# Patient Record
Sex: FEMALE | Race: AMERICAN INDIAN OR ALASKA NATIVE | HISPANIC OR LATINO
[De-identification: names, ages, dates, MRNs, and addresses within clinical notes are randomized per-mention and may not be internally consistent; named-entity substitution may affect disease eponyms.]

---

## 2021-12-08 PROBLEM — Z00.00 ENCOUNTER FOR PREVENTIVE HEALTH EXAMINATION: Status: ACTIVE | Noted: 2021-12-08

## 2021-12-14 ENCOUNTER — RESULT REVIEW (OUTPATIENT)
Age: 86
End: 2021-12-14

## 2021-12-14 ENCOUNTER — APPOINTMENT (OUTPATIENT)
Dept: SURGICAL ONCOLOGY | Facility: CLINIC | Age: 86
End: 2021-12-14
Payer: MEDICARE

## 2021-12-14 ENCOUNTER — APPOINTMENT (OUTPATIENT)
Dept: CT IMAGING | Facility: CLINIC | Age: 86
End: 2021-12-14
Payer: MEDICARE

## 2021-12-14 ENCOUNTER — OUTPATIENT (OUTPATIENT)
Dept: OUTPATIENT SERVICES | Facility: HOSPITAL | Age: 86
LOS: 1 days | End: 2021-12-14

## 2021-12-14 VITALS
BODY MASS INDEX: 21.14 KG/M2 | SYSTOLIC BLOOD PRESSURE: 135 MMHG | TEMPERATURE: 98.8 F | HEART RATE: 89 BPM | WEIGHT: 112 LBS | HEIGHT: 61 IN | OXYGEN SATURATION: 98 % | DIASTOLIC BLOOD PRESSURE: 74 MMHG

## 2021-12-14 DIAGNOSIS — Z86.2 PERSONAL HISTORY OF DISEASES OF THE BLOOD AND BLOOD-FORMING ORGANS AND CERTAIN DISORDERS INVOLVING THE IMMUNE MECHANISM: ICD-10-CM

## 2021-12-14 DIAGNOSIS — Z86.69 PERSONAL HISTORY OF OTHER DISEASES OF THE NERVOUS SYSTEM AND SENSE ORGANS: ICD-10-CM

## 2021-12-14 DIAGNOSIS — Z86.19 PERSONAL HISTORY OF OTHER INFECTIOUS AND PARASITIC DISEASES: ICD-10-CM

## 2021-12-14 DIAGNOSIS — Z87.891 PERSONAL HISTORY OF NICOTINE DEPENDENCE: ICD-10-CM

## 2021-12-14 DIAGNOSIS — Z80.0 FAMILY HISTORY OF MALIGNANT NEOPLASM OF DIGESTIVE ORGANS: ICD-10-CM

## 2021-12-14 DIAGNOSIS — Z86.73 PERSONAL HISTORY OF TRANSIENT ISCHEMIC ATTACK (TIA), AND CEREBRAL INFARCTION W/OUT RESIDUAL DEFICITS: ICD-10-CM

## 2021-12-14 DIAGNOSIS — M32.9 SYSTEMIC LUPUS ERYTHEMATOSUS, UNSPECIFIED: ICD-10-CM

## 2021-12-14 DIAGNOSIS — Z87.11 PERSONAL HISTORY OF PEPTIC ULCER DISEASE: ICD-10-CM

## 2021-12-14 PROCEDURE — 74177 CT ABD & PELVIS W/CONTRAST: CPT | Mod: 26

## 2021-12-14 PROCEDURE — 99204 OFFICE O/P NEW MOD 45 MIN: CPT

## 2021-12-14 PROCEDURE — 71260 CT THORAX DX C+: CPT | Mod: 26

## 2021-12-14 RX ORDER — ROSUVASTATIN CALCIUM 20 MG/1
20 TABLET, FILM COATED ORAL
Refills: 0 | Status: ACTIVE | COMMUNITY

## 2021-12-14 RX ORDER — LIDOCAINE 5% 700 MG/1
5 PATCH TOPICAL
Refills: 0 | Status: ACTIVE | COMMUNITY

## 2021-12-14 RX ORDER — HYDROXYCHLOROQUINE SULFATE 200 MG/1
200 TABLET, FILM COATED ORAL
Refills: 0 | Status: ACTIVE | COMMUNITY

## 2021-12-14 RX ORDER — PANTOPRAZOLE 40 MG/1
40 TABLET, DELAYED RELEASE ORAL
Refills: 0 | Status: ACTIVE | COMMUNITY

## 2021-12-14 RX ORDER — ASPIRIN 81 MG
81 TABLET, DELAYED RELEASE (ENTERIC COATED) ORAL
Refills: 0 | Status: ACTIVE | COMMUNITY

## 2021-12-14 RX ORDER — ALENDRONATE SODIUM 70 MG/1
70 TABLET ORAL
Refills: 0 | Status: ACTIVE | COMMUNITY

## 2021-12-14 NOTE — REASON FOR VISIT
[Initial Consultation] : an initial consultation for [Gastric Cancer] : gastric cancer [Family Member] : family member

## 2021-12-15 ENCOUNTER — TRANSCRIPTION ENCOUNTER (OUTPATIENT)
Age: 86
End: 2021-12-15

## 2021-12-15 NOTE — RESULTS/DATA
[FreeTextEntry1] : Diagnostic Studies\par \par Date: 11/23/21\par Study: EGD (Dr. 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 Note: The tumor cells are positive for CK7, CDX2 (focal and weak), and P53 (focal and weak, wild type), while negative for CK20, ER, and GATA3. The morphology and immuno profile are consistent with a gastrointestinal origin.

## 2021-12-15 NOTE — HISTORY OF PRESENT ILLNESS
[de-identified] : Patient Name: ROXI DÍAZ \par MRN: [id number] \par Brent MRN:\par Referring Provider: EFREN SOUZA MD \par Oncologist: alejandrina\par Date: 12/14/21\par \par Diagnosis: gastric cancer\par \par 86 year female  presents for evaluation of gastric cancer\par Patient's medical history information below is from Dr. Souza's note and daughters report.\par She developed abdominal pain (RUQ), bloating, and heartburn back in 2014/2015. She has been following up and worked up for this since then. She's reportedly had an EGD and colonoscopy in 2014 that didn't show any suggestive findings. She was started on Ranitadine and had improvement in symptoms for a few years. In 2019, she followed up and had complaints of epigastric and substernal and LLQ abdominal pain. She was also anemic and was at [facility] where she was advised to have an upper EGD and colonoscopy. In March 2020 she got COVID and was noticing that she is losing weight unintentionally. Patient did not go for colon/egd until 8/2020 when she was found to have gastric erosions and was positive for H pylori. 2 months later she tested positive for H Pylori again. She reportedly had a CT scan in May 2021 that showed trace pelvic ascites. was supposed to have a colonoscopy again but was unable to because she had 3 pleural effusions and was seeing a doctor at [facility] for this. In August 6, 2021 she was admitted to [facility] for workup of stroke. She had a TIA. She was discharged home but had drooping in her face while at home and went back to [facility] on August 10, 2021 and was diagnosed with Guillain [place] Syndrome. She was being treated inpatient and was also diagnosed with Lupus and Lyme disease. CT scan during hospitalization revealed an antral lesion. EGD advised. EGD was done in December 2021.  EGD showed soft tissue lesion in prepyloric/pyloric channel area. biopsy is positive for adenocarcinoma.\par \par Currently, Ms. DÍAZ denies abdominal pain or discomfort. Denies nausea or vomiting. Has had weight loss.\par \par Functional Status: Ms. DÍAZ is able to walk steps slowly without fatigue or dyspnea but is unsteady at times on long walks and uses a wheelchair at times to get around.\par

## 2021-12-15 NOTE — PHYSICAL EXAM
[Normal Neck Lymph Nodes] : normal neck lymph nodes  [Normal Supraclavicular Lymph Nodes] : normal supraclavicular lymph nodes [Normal] : oriented to person, place and time, with appropriate affect [de-identified] : anicteric [de-identified] : S1,S2, regular rate and rhythm. No murmurs heard. [de-identified] : Clear throughout. No wheezes heard. [de-identified] : warm, dry

## 2021-12-15 NOTE — ASSESSMENT
[FreeTextEntry1] : I) gastric cancer\par \par P) Long discussion w patient and daughter w . Has what looks like an early lesion in the antrum. Will need updated imaging for staging. We discussed treatment will involve surgey (minimally invasive distal gastrectomy) and we will discuss whether a role for preop chemotherapy based on imaging. Given patients overall appearance and age, I am leaning toward resection if no evidence for advanced disease as she wont tolerate FLOT which is the best preop regimen , and there is benefit to adjuvant Capox (Classic trial). She also needs medical evaluation and clearance. We will review at tumor board as well. Final planning and recs will be made after obtaining all the added staging information and medical evaluation. All questions answered.\par \par Gab Soto MD\par \par Chief Surgical Oncology\par Multidisciplinary GI cancer program\par [facility]\par [facility]\par \par Professor Surgery\par [employer] [facility] [employer]\par \par cc Dr Tres Howard [address], [place] [address]

## 2021-12-15 NOTE — REVIEW OF SYSTEMS
[Negative] : Heme/Lymph [FreeTextEntry8] : as per HPI [de-identified] : neuralgia from history of shingles [de-identified] : feels anxious/depression lately

## 2021-12-21 ENCOUNTER — NON-APPOINTMENT (OUTPATIENT)
Age: 86
End: 2021-12-21

## 2021-12-22 ENCOUNTER — RESULT REVIEW (OUTPATIENT)
Age: 86
End: 2021-12-22

## 2021-12-22 ENCOUNTER — OUTPATIENT (OUTPATIENT)
Dept: OUTPATIENT SERVICES | Facility: HOSPITAL | Age: 86
LOS: 1 days | Discharge: HOME | End: 2021-12-22
Payer: MEDICARE

## 2021-12-22 ENCOUNTER — APPOINTMENT (OUTPATIENT)
Dept: GASTROENTEROLOGY | Facility: HOSPITAL | Age: 86
End: 2021-12-22

## 2021-12-22 PROCEDURE — 88305 TISSUE EXAM BY PATHOLOGIST: CPT | Mod: 26

## 2021-12-22 PROCEDURE — 43259 EGD US EXAM DUODENUM/JEJUNUM: CPT | Mod: 59

## 2021-12-22 PROCEDURE — 43239 EGD BIOPSY SINGLE/MULTIPLE: CPT

## 2021-12-22 PROCEDURE — 43237 ENDOSCOPIC US EXAM ESOPH: CPT

## 2021-12-22 PROCEDURE — 88305 TISSUE EXAM BY PATHOLOGIST: CPT

## 2021-12-27 LAB — SURGICAL PATHOLOGY STUDY: SIGNIFICANT CHANGE UP

## 2022-01-04 ENCOUNTER — RESULT REVIEW (OUTPATIENT)
Age: 87
End: 2022-01-04

## 2022-01-04 ENCOUNTER — OUTPATIENT (OUTPATIENT)
Dept: OUTPATIENT SERVICES | Facility: HOSPITAL | Age: 87
LOS: 1 days | End: 2022-01-04
Payer: MEDICARE

## 2022-01-04 ENCOUNTER — APPOINTMENT (OUTPATIENT)
Dept: SURGICAL ONCOLOGY | Facility: CLINIC | Age: 87
End: 2022-01-04
Payer: MEDICARE

## 2022-01-04 DIAGNOSIS — C16.9 MALIGNANT NEOPLASM OF STOMACH, UNSPECIFIED: ICD-10-CM

## 2022-01-04 DIAGNOSIS — J90 PLEURAL EFFUSION, NOT ELSEWHERE CLASSIFIED: ICD-10-CM

## 2022-01-04 LAB — SURGICAL PATHOLOGY STUDY: SIGNIFICANT CHANGE UP

## 2022-01-04 PROCEDURE — 88321 CONSLTJ&REPRT SLD PREP ELSWR: CPT

## 2022-01-04 PROCEDURE — 99213 OFFICE O/P EST LOW 20 MIN: CPT | Mod: 95

## 2022-01-04 NOTE — HISTORY OF PRESENT ILLNESS
[Home] : at home, [unfilled] , at the time of the visit. [Medical Office: ([facility])___] : at the medical office located in  [Formal Caregiver] : formal caregiver [Verbal consent obtained from patient] : the patient, [unfilled] [Family Member] : family member [de-identified] : Patient Name: ROXI DÍAZ \par MRN: [id number] \par Brent MRN: [id number]\par Referring Provider: EFREN SOUZA MD \par Oncologist: alejandrina\par Date: 1/4/21\par \par Diagnosis: gastric cancer\par \par 86 year female  presents for follow up of gastric cancer. Since her initial consultation, she had a CT CAP and underwent a thoracentesis on 12/21/21, she is awaiting to see her cardiologist, and she also underwent an EUS with Dr. Teran on 12/22/21 and is here to discuss surgical planning. \par \par She denies abdominal pain, she is tolerating two meals a day and two ensure per day, she denies any nausea or vomiting. She denies any changes to bowel movements.

## 2022-01-04 NOTE — ASSESSMENT
[FreeTextEntry1] : I) Gastric cancer\par \par P) We reviewed all the data which shows an early gastric cancer. Standard of care is a resection which will require a distal gastrectomy. Will do minimally invasively. Risks and benefits discussed, including but not limited to post bleeding, infection, anastomotic leak. Still needs cardiac clearance and a repeat CXR to assure the effusion has not reaccumulated. All questions answered. Tentatively aiming for end of January pending the clearances.\par \par Gab Soto MD\par \par Chief Surgical Oncology\par Multidisciplinary GI cancer program\par [facility]\par [facility]\par \par Professor Surgery\par [employer] [facility] [employer]\par  cc Dr Tres Howard\par \par \par

## 2022-01-04 NOTE — RESULTS/DATA
[FreeTextEntry1] : Diagnostic Studies\par \par Date: 12/22/21\par Study: EGD/EUS\par Results:Impressions:  \par  Early gastric cancer. Evidence of an EGC without any deep invasion. Possibly \par T1A lesion. No LNs identified.  \par \par \par Pathology:\par Patient:   ROXI DÍAZ\par \par \par Accession:                             [id number]\par \par Collected Date/Time:                   12/22/2021 15:06 EST\par Received Date/Time:                    12/22/2021 18:17 EST\par \par Addendum Report - Auth (Verified)\par \par Addendum\par HER2 IMMUNOHISTOCHEMICAL ANALYSIS FOR GASTROINTESTINAL CARCINOMA\par \par Addendum issued to report results of HER2 Immunohistochemical Analysis.\par This test was performed by an outside laboratory. For all patient care and\par clinical decision making purposes refer solely to original laboratory\par report. The information transcribed here is not the entire report. This\par shorten version has been placed here for the purpose of the electronic\par record.\par \par Performing Laboratory: [facility]\par Surgical ID #: [id number]\par Specimen ID: [id number]\par Date reported by Outside Laboratory: 12/29/2021\par Submitting Physician: Dr. Ignacio Teran\par Outside report electronically signed by: Dr. Shana Alvarez\par \par \par RESULTS\par Antibody   Clone  Description   Results\par Her2/nestor by IHC   4B5   c-erb-2 oncoprotein   0 / Negative\par \par COMMENT\par Immunohistochemistry scoring for Her2 in gastric and gastroesophageal\par carcinoma (2016 CAP/ASCO guidelines)\par \par Biopsy specimen staining pattern:\par 0 No reactivity or no membranous reactivity in any tumor cell\par 1+ Tumor cell cluster with a faint or barely perceptible membranous\par reactivity irrespective of percentage of tumor cells\par 2+ Tumor cell cluster with a weak to moderate complete, basolateral, or\par lateral membranous reactivity irrespective of percentage of tumor cells\par stained\par 3+ Tumor cell cluster with a strong complete, basolateral, or lateral\par membranous reactivity irrespective of tumor cells stained\par \par Surgical specimen staining pattern:\par \par 0 No reactivity or membranous reactivity in less than 10% of tumor cells\par 1+ Faint or barely perceptible membranous reactivity in 10% or more of\par tumor cells; cells are reactive only in part of their membrane.\par 2+ Weak to moderate complete, basolateral, or lateral membranous\par reactivity in 10% or more of tumor cells\par 3+ Strong complete, basolateral, or lateral membranous reactivity in 10%\par or more of tumor cells\par \par This test is for investigation use only in colorectal carcinoma.\par A positive control for each antibody has been reviewed and accepted.\par \par Shana Alvarez M.D.\par Pathologist, ex. [phone]\par This report was electronically signed.\par \par This addendum reports the results of HER2 Immunohistochemical Analysis,\par reported on 12/29/2021 by [facility], a specialized [facility].\par Technical and professional components were performed at [facility]\par [employer] [address] [place] [address],\par phone:  [phone], on [facility] specimen [id number]\par with [facility] ID [id number].  Please refer to the official [facility] report\par with complete information including assay design and methodology, on file\par in the Pathology Department.\par \par Verified by: Nael Kumar M.D.\par (Electronic Signature)\par Reported on: 12/30/21 18:36 EST, [facility], [address],\[place] [address]\par Phone: [phone]   Fax: [phone]\par _________________________________________________________________\par \par Surgical Pathology Report - Auth (Verified)\par Specimen(s) Submitted\par 1 antrum mass\par \par \par Final Diagnosis\par Stomach, antrum, biopsy:\par - 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 A second intradepartmental\par pathologist has reviewed this case and agrees in accordance with\par departmental policy.\par \par Verified by: Nael Kumar M.D.\par (Electronic Signature)\par Reported on: 12/27/21 16:16 EST, [facility], [address] [place] [address],\[place] [address]\par Phone: [phone]   Fax: [phone]\par _________________________________________________________________\par \par Clinical Information\par Gastric cancer\par \par Gross Description\par Received in formalin labeled   "antrum mass"  is a 0.3 cm in greatest\par dimension tan-pink, soft tissue fragment. Entirely in one cassette: 1A.\par \par MARGE Overton ([facility]) 12/23/2021 09:36 AM\par \par \par Disclaimer\par In addition to other data that may appear on the specimen containers, all\par labels have been inspected to confirm the presence of the patient's name\par and date of birth.\par \par Histological Processing Performed at [facility], Department of\par Pathology, [address] [place] [address].\par

## 2022-01-06 ENCOUNTER — APPOINTMENT (OUTPATIENT)
Dept: CT IMAGING | Facility: CLINIC | Age: 87
End: 2022-01-06
Payer: MEDICARE

## 2022-01-06 ENCOUNTER — OUTPATIENT (OUTPATIENT)
Dept: OUTPATIENT SERVICES | Facility: HOSPITAL | Age: 87
LOS: 1 days | End: 2022-01-06

## 2022-01-06 PROCEDURE — 75574 CT ANGIO HRT W/3D IMAGE: CPT | Mod: 26

## 2022-01-24 ENCOUNTER — LABORATORY RESULT (OUTPATIENT)
Age: 87
End: 2022-01-24

## 2022-01-26 ENCOUNTER — TRANSCRIPTION ENCOUNTER (OUTPATIENT)
Age: 87
End: 2022-01-26

## 2022-01-26 ENCOUNTER — RESULT REVIEW (OUTPATIENT)
Age: 87
End: 2022-01-26

## 2022-01-26 ENCOUNTER — INPATIENT (INPATIENT)
Facility: HOSPITAL | Age: 87
LOS: 6 days | Discharge: NURSING, REHAB OR LONG-TERM CARE | DRG: 326 | End: 2022-02-02
Attending: SURGERY | Admitting: SURGERY
Payer: MEDICARE

## 2022-01-26 VITALS
HEART RATE: 89 BPM | RESPIRATION RATE: 18 BRPM | SYSTOLIC BLOOD PRESSURE: 101 MMHG | DIASTOLIC BLOOD PRESSURE: 65 MMHG | OXYGEN SATURATION: 95 % | TEMPERATURE: 98 F

## 2022-01-26 LAB
ALBUMIN FLD-MCNC: 2 G/DL — SIGNIFICANT CHANGE UP
APTT BLD: 31.8 SEC — SIGNIFICANT CHANGE UP (ref 27.5–35.5)
BLD GP AB SCN SERPL QL: NEGATIVE — SIGNIFICANT CHANGE UP
CREAT FLD-MCNC: 1.11 MG/DL — SIGNIFICANT CHANGE UP
GLUCOSE FLD-MCNC: 130 MG/DL — SIGNIFICANT CHANGE UP
GRAM STN FLD: SIGNIFICANT CHANGE UP
HCT VFR BLD CALC: 28.8 % — LOW (ref 34.5–45)
HGB BLD-MCNC: 9.5 G/DL — LOW (ref 11.5–15.5)
INR BLD: 1.1 — SIGNIFICANT CHANGE UP (ref 0.88–1.16)
LDH SERPL L TO P-CCNC: 100 U/L — SIGNIFICANT CHANGE UP
MCHC RBC-ENTMCNC: 25.7 PG — LOW (ref 27–34)
MCHC RBC-ENTMCNC: 33 GM/DL — SIGNIFICANT CHANGE UP (ref 32–36)
MCV RBC AUTO: 77.8 FL — LOW (ref 80–100)
NRBC # BLD: 0 /100 WBCS — SIGNIFICANT CHANGE UP (ref 0–0)
PH, PLEURAL FLUID: 7.71 — SIGNIFICANT CHANGE UP
PLATELET # BLD AUTO: 175 K/UL — SIGNIFICANT CHANGE UP (ref 150–400)
PROT FLD-MCNC: 4.9 G/DL — SIGNIFICANT CHANGE UP
PROTHROM AB SERPL-ACNC: 13.1 SEC — SIGNIFICANT CHANGE UP (ref 10.6–13.6)
RBC # BLD: 3.7 M/UL — LOW (ref 3.8–5.2)
RBC # FLD: 14.7 % — HIGH (ref 10.3–14.5)
RH IG SCN BLD-IMP: POSITIVE — SIGNIFICANT CHANGE UP
SARS-COV-2 RNA SPEC QL NAA+PROBE: SIGNIFICANT CHANGE UP
SPECIMEN SOURCE FLD: SIGNIFICANT CHANGE UP
SPECIMEN SOURCE: SIGNIFICANT CHANGE UP
WBC # BLD: 6.81 K/UL — SIGNIFICANT CHANGE UP (ref 3.8–10.5)
WBC # FLD AUTO: 6.81 K/UL — SIGNIFICANT CHANGE UP (ref 3.8–10.5)

## 2022-01-26 PROCEDURE — 71045 X-RAY EXAM CHEST 1 VIEW: CPT | Mod: 26

## 2022-01-26 PROCEDURE — 88305 TISSUE EXAM BY PATHOLOGIST: CPT | Mod: 26

## 2022-01-26 PROCEDURE — 99222 1ST HOSP IP/OBS MODERATE 55: CPT | Mod: GC,25

## 2022-01-26 PROCEDURE — 32551 INSERTION OF CHEST TUBE: CPT | Mod: GC

## 2022-01-26 PROCEDURE — 88112 CYTOPATH CELL ENHANCE TECH: CPT | Mod: 26

## 2022-01-26 RX ORDER — HYDROXYCHLOROQUINE SULFATE 200 MG
1 TABLET ORAL
Qty: 0 | Refills: 0 | DISCHARGE

## 2022-01-26 RX ORDER — ONDANSETRON 8 MG/1
4 TABLET, FILM COATED ORAL EVERY 4 HOURS
Refills: 0 | Status: DISCONTINUED | OUTPATIENT
Start: 2022-01-26 | End: 2022-01-27

## 2022-01-26 RX ORDER — ACETAMINOPHEN 500 MG
650 TABLET ORAL EVERY 6 HOURS
Refills: 0 | Status: DISCONTINUED | OUTPATIENT
Start: 2022-01-26 | End: 2022-01-27

## 2022-01-26 RX ORDER — PANTOPRAZOLE SODIUM 20 MG/1
1 TABLET, DELAYED RELEASE ORAL
Qty: 0 | Refills: 0 | DISCHARGE

## 2022-01-26 RX ORDER — DULOXETINE HYDROCHLORIDE 30 MG/1
0 CAPSULE, DELAYED RELEASE ORAL
Qty: 0 | Refills: 0 | DISCHARGE

## 2022-01-26 RX ORDER — SODIUM CHLORIDE 9 MG/ML
1000 INJECTION, SOLUTION INTRAVENOUS
Refills: 0 | Status: DISCONTINUED | OUTPATIENT
Start: 2022-01-26 | End: 2022-01-27

## 2022-01-26 RX ORDER — ALENDRONATE SODIUM 70 MG/1
70 TABLET ORAL
Qty: 0 | Refills: 0 | DISCHARGE

## 2022-01-26 RX ORDER — GABAPENTIN 400 MG/1
300 CAPSULE ORAL
Refills: 0 | Status: DISCONTINUED | OUTPATIENT
Start: 2022-01-26 | End: 2022-01-27

## 2022-01-26 RX ORDER — ENOXAPARIN SODIUM 100 MG/ML
40 INJECTION SUBCUTANEOUS DAILY
Refills: 0 | Status: DISCONTINUED | OUTPATIENT
Start: 2022-01-26 | End: 2022-01-26

## 2022-01-26 RX ORDER — LATANOPROST 0.05 MG/ML
1 SOLUTION/ DROPS OPHTHALMIC; TOPICAL
Qty: 0 | Refills: 0 | DISCHARGE

## 2022-01-26 RX ORDER — ALENDRONATE SODIUM 70 MG/1
20 TABLET ORAL
Qty: 0 | Refills: 0 | DISCHARGE

## 2022-01-26 RX ORDER — GABAPENTIN 400 MG/1
1 CAPSULE ORAL
Qty: 0 | Refills: 0 | DISCHARGE

## 2022-01-26 RX ORDER — LIDOCAINE HCL 20 MG/ML
4 VIAL (ML) INJECTION ONCE
Refills: 0 | Status: COMPLETED | OUTPATIENT
Start: 2022-01-26 | End: 2022-01-26

## 2022-01-26 RX ORDER — DULOXETINE HYDROCHLORIDE 30 MG/1
20 CAPSULE, DELAYED RELEASE ORAL DAILY
Refills: 0 | Status: DISCONTINUED | OUTPATIENT
Start: 2022-01-26 | End: 2022-01-27

## 2022-01-26 RX ORDER — ENOXAPARIN SODIUM 100 MG/ML
40 INJECTION SUBCUTANEOUS EVERY 24 HOURS
Refills: 0 | Status: DISCONTINUED | OUTPATIENT
Start: 2022-01-26 | End: 2022-01-27

## 2022-01-26 RX ORDER — ROSUVASTATIN CALCIUM 5 MG/1
1 TABLET ORAL
Qty: 0 | Refills: 0 | DISCHARGE

## 2022-01-26 RX ORDER — ASPIRIN/CALCIUM CARB/MAGNESIUM 324 MG
1 TABLET ORAL
Qty: 0 | Refills: 0 | DISCHARGE

## 2022-01-26 RX ORDER — LIDOCAINE 4 G/100G
0 CREAM TOPICAL
Qty: 0 | Refills: 0 | DISCHARGE

## 2022-01-26 RX ADMIN — ENOXAPARIN SODIUM 40 MILLIGRAM(S): 100 INJECTION SUBCUTANEOUS at 22:04

## 2022-01-26 RX ADMIN — GABAPENTIN 300 MILLIGRAM(S): 400 CAPSULE ORAL at 22:04

## 2022-01-26 RX ADMIN — DULOXETINE HYDROCHLORIDE 20 MILLIGRAM(S): 30 CAPSULE, DELAYED RELEASE ORAL at 22:04

## 2022-01-26 RX ADMIN — SODIUM CHLORIDE 40 MILLILITER(S): 9 INJECTION, SOLUTION INTRAVENOUS at 23:57

## 2022-01-26 NOTE — H&P ADULT - ASSESSMENT
87 yo female with hx of lyme diseases, lupus and gastric adenocarcinoma presenting with a pleaural effusion prior to surgery. Admitted to [facility] for observation and pleural effusion drainage prior to surgery tomorrow (1/27).     Soft and bite sized with ensure, NPO @midnight  Nausea/Vomiting PRN  Lovenox/OOBA/IS  Pulm  OR tomorrow     85 yo female with hx of lyme diseases, lupus and gastric adenocarcinoma presenting with a pleural effusion prior to surgery. Admitted to [facility] for observation and pleural effusion drainage prior to surgery tomorrow (1/27).     Soft and bite sized with ensure, NPO @midnight  Nausea/Vomiting PRN  Lovenox/OOBA/IS  Pulm recs  OR tomorrow  Effusion drainage tonight

## 2022-01-26 NOTE — PATIENT PROFILE ADULT - FUNCTIONAL ASSESSMENT - DAILY ACTIVITY 6.
Did well overnight except chronic leg pain which is being worked up as outpatient.  AFVSS  Abd soft, min dist, NT    A/P: Resolving pSBO  1. clear liquids. If tolerated, low residue diet for lunch and d/c home in evening.    Dr. Lucas covering until 8/19 4 = No assist / stand by assistance

## 2022-01-26 NOTE — PATIENT PROFILE ADULT - FALL HARM RISK - HARM RISK INTERVENTIONS

## 2022-01-26 NOTE — H&P ADULT - NSICDXPASTMEDICALHX_GEN_ALL_CORE_FT
PAST MEDICAL HISTORY:  Anemia due to acute blood loss     Gastric cancer     Gastric ulcer     GBS (Guillain [place] syndrome)     H/O Helicobacter infection     H/o Lyme disease     H/O pulmonary valve disease     History of TIA due to embolism     LE (lupus erythematosus)     Malignancy     Pleural effusion, left     Stroke

## 2022-01-26 NOTE — H&P ADULT - HISTORY OF PRESENT ILLNESS
85 yo female with hx of TIA, lyme diseases, lupus, Guillain [place] Syndrome and gastric adenocarcinoma presenting with a pleaural effusion prior to surgery. The effusion has been drained several times before - last time in November Becomes short of breath when walking 20 steps, sleeps on 2-3 pillows. She denies dysphagia, nausea/vomiting, last BM yesterday.   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, moderately differentiated, intestinal type, immuno possitive for CK-7, p53, neg CK20, ER and JULIANNA -3 , Her2/nestor   Colonoscopy - 5 years ago (normal)    PMH: lyme disease, lupus, COVID feb 2021  PSH: none  Fam Hx: suspected gallbladder cancer - mother  Soc: smoker in 20s for 5 years - pack a day, no alcohol, no drugs  Meds: ASA 81mg, gabapentin 300 mg BID, hydroxychloroquine 200mg, rosuvastatin 20mg, duloxetine 20mg   85 yo female with hx of TIA, lyme diseases, lupus, Guillain [place] Syndrome and gastric adenocarcinoma presenting with a pleaural effusion prior to surgery. The effusion has been drained several times before - last time in November Becomes short of breath when walking 20 steps, sleeps on 2-3 pillows. She denies dysphagia, nausea/vomiting, last BM yesterday.   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

## 2022-01-26 NOTE — H&P ADULT - NSICDXFAMILYHX_GEN_ALL_CORE_FT
FAMILY HISTORY:  Mother  Still living? Unknown  Family history of cancer of gallbladder, Age at diagnosis: Age Unknown

## 2022-01-26 NOTE — H&P ADULT - NSHPLABSRESULTS_GEN_ALL_CORE
.  LABS:                         9.5    6.81  )-----------( 175      ( 26 Jan 2022 16:49 )             28.8           PT/INR - ( 26 Jan 2022 16:49 )   PT: 13.1 sec;   INR: 1.10          PTT - ( 26 Jan 2022 16:49 )  PTT:31.8 sec          RADIOLOGY, EKG & ADDITIONAL TESTS: Reviewed.

## 2022-01-26 NOTE — H&P ADULT - NSHPPHYSICALEXAM_GEN_ALL_CORE
VITALS:   T(C): 36.3 (01-26-22 @ 16:22), Max: 36.7 (01-26-22 @ 14:27)  HR: 90 (01-26-22 @ 16:22) (89 - 90)  BP: 126/71 (01-26-22 @ 16:22) (101/65 - 126/71)  RR: 17 (01-26-22 @ 16:22) (17 - 18)  SpO2: 96% (01-26-22 @ 16:22) (95% - 96%)    GENERAL: NAD, lying in bed comfortably  CHEST/LUNG: Unlabored respirations  HEART: Regular rate and rhythm  ABDOMEN: Soft, nontender, nondistended, no guarding   EXTREMITIES:  2+ Peripheral Pulses, brisk capillary refill, no edema  NERVOUS SYSTEM:  no focal deficits   SKIN: No rashes or lesions

## 2022-01-26 NOTE — PROCEDURE NOTE - ADDITIONAL PROCEDURE DETAILS
Ultrasound guided LEFT sided chest tube placement. Wire confirmed on ultrasound prior to dilation and catheter insertion.

## 2022-01-26 NOTE — PROCEDURE NOTE - NSPROCDETAILS_GEN_ALL_CORE
Seldinger technique/dressing applied/secured in place/sterile dressing applied/supine position/thoracostomy tube placed percutaneously/ultrasound assessment of fluid (location) Seldinger technique/dressing applied/secured in place/sterile dressing applied/thoracostomy tube placed percutaneously/ultrasound assessment of fluid (location)

## 2022-01-26 NOTE — CONSULT NOTE ADULT - ASSESSMENT
This is an 86F with PMHx of gastric adenocarcinoma, recent history of TIA, GBS, lupus and lyme disease and recurrent transudative left sided pleural effusions who presents for gastrectomy planned for 1/27. Interventional pulmonology consulted for placement of chest tube for L pleural effusion prior to surgery in anticipation of fluid recurrence.    #Left sided pleural effusion- simple    Patient with history of recurrent L pleural effusion and gastric adenocarcinoma. Fluid has reaccumulated and is symptomatic for the last half year. Ddx includes inc hydrostatic pressure vs 2/2 malignancy vs parapneumonic which is less likely. Will plan for chest tube placement perioperatively and will send fluid studies including cytology and triglyceride levels.    Recommend:  - Please send labs as soon as possible  - Will plan for chest tube placement tonight on left side    Patient discussed with attending Dr. Agee. This is an 86F with PMHx of gastric adenocarcinoma, recent history of TIA, GBS, lupus and lyme disease and recurrent transudative left sided pleural effusions who presents for gastrectomy planned for 1/27. Interventional pulmonology consulted for placement of chest tube for L pleural effusion prior to surgery in anticipation of fluid recurrence.    #Left sided pleural effusion- simple, recurrent, previously transudative    Patient with history of recurrent L pleural effusion and gastric adenocarcinoma. Fluid has reaccumulated and is symptomatic for the last half year. Ddx includes inc hydrostatic pressure vs 2/2 malignancy vs less likely parapneumonic. Will plan for chest tube placement perioperatively and will send fluid studies including cytology and triglyceride levels.    Recommend:  - S/p LEFT pigtail placement 1/26/2022  - Keep chest tube to waterseal  - Will f/u micro, cyto, cell count and studies  - Plan to remove chest tube post-op, will follow    Patient discussed with attending Dr. Agee. This is an 86F with PMHx of gastric adenocarcinoma, recent history of TIA, GBS, lupus and lyme disease and recurrent transudative left sided pleural effusions who presents for gastrectomy planned for 1/27. Interventional pulmonology consulted for placement of chest tube for L pleural effusion prior to surgery in anticipation of fluid recurrence.    #Left sided pleural effusion- simple, recurrent, previously transudative    Patient with history of recurrent L pleural effusion and gastric adenocarcinoma. Fluid has reaccumulated and is symptomatic for the last half year. Ddx includes inc hydrostatic pressure vs 2/2 malignancy vs less parapneumonic vs. post traumatic. Will plan for chest tube placement perioperatively and will send fluid studies including cytology and triglyceride levels.    Recommend:  - S/p LEFT pigtail placement 1/26/2022  - Keep chest tube to waterseal  - Will f/u micro, cyto, cell count and studies  - Plan to remove chest tube post-op, will follow    Patient discussed with attending Dr. Agee.

## 2022-01-26 NOTE — CONSULT NOTE ADULT - SUBJECTIVE AND OBJECTIVE BOX
INTERVENTIONAL PULMONARY SERVICE INITIAL CONSULT NOTE    HPI:  85 yo female with hx of TIA, lyme diseases, lupus, Guillain [place] Syndrome and gastric adenocarcinoma presenting with a pleaural effusion prior to surgery. The effusion has been drained several times before - last time in November Becomes short of breath when walking 20 steps, sleeps on 2-3 pillows. She denies dysphagia, nausea/vomiting, last BM yesterday.   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, moderately differentiated, intestinal type, immuno possitive for CK-7, p53, neg CK20, ER and JULIANNA -3 , Her2/nestor   Colonoscopy - 5 years ago (normal)    PMH: lyme disease, lupus, COVID feb 2021  PSH: none  Fam Hx: suspected gallbladder cancer - mother  Soc: smoker in 20s for 5 years - pack a day, no alcohol, no drugs  Meds: ASA 81mg, gabapentin 300 mg BID, hydroxychloroquine 200mg, rosuvastatin 20mg, duloxetine 20mg   (26 Jan 2022 16:16)      REVIEW OF SYSTEMS:  Constitutional: No fever, weight loss or fatigue  Eyes: No eye pain, visual disturbances, or discharge  ENMT:  No difficulty hearing, tinnitus, vertigo; No sinus or throat pain  Neck: No pain, stiffness or neck swelling  Respiratory: see HPI  Cardiovascular: No chest pain, palpitations, dizziness or leg swelling  Gastrointestinal: No abdominal or epigastric pain. No nausea, vomiting or hematemesis; No diarrhea or constipation. No melena or hematochezia.  Genitourinary: No dysuria, frequency, hematuria or incontinence  Neurological: No headaches, memory loss, loss of strength, numbness or tremors  Skin: No itching, burning, rashes or lesions   Lymph Nodes: No enlarged glands  Endocrine: No heat or cold intolerance; No hair loss  Musculoskeletal: No joint pain or swelling; No muscle, back or extremity pain  Psychiatric: No depression, anxiety, mood swings or difficulty sleeping  Heme/Lymph: No easy bruising or bleeding gums  Allergy and Immunologic: No hives or eczema    PAST MEDICAL & SURGICAL HISTORY:  Stroke    H/O pulmonary valve disease    Malignancy    GBS (Guillain Parker syndrome)    LE (lupus erythematosus)    Pleural effusion, left    Gastric cancer    Anemia due to acute blood loss    Gastric ulcer    H/O Helicobacter infection    H/o Lyme disease    History of TIA due to embolism    No significant past surgical history        FAMILY HISTORY:  Family history of cancer of gallbladder (Mother)        SOCIAL HISTORY:  Smoking Status: [ ] Current, [ ] Former, [ ] Never  Pack Years:    MEDICATIONS:  Pulmonary:    Antimicrobials:    Anticoagulants:    Onc:    GI/:    Endocrine:    Cardiac:    Other Medications:  lactated ringers. 1000 milliLiter(s) IV Continuous <Continuous>      Allergies    Allergy Status Unknown    Intolerances        Vital Signs Last 24 Hrs  T(C): 36.3 (26 Jan 2022 16:22), Max: 36.7 (26 Jan 2022 14:27)  T(F): 97.4 (26 Jan 2022 16:22), Max: 98 (26 Jan 2022 14:27)  HR: 90 (26 Jan 2022 16:22) (89 - 90)  BP: 126/71 (26 Jan 2022 16:22) (101/65 - 126/71)  BP(mean): --  RR: 17 (26 Jan 2022 16:22) (17 - 18)  SpO2: 96% (26 Jan 2022 16:22) (95% - 96%)        PHYSICAL EXAM:  Constitutional: WD  Head: NC/AT  EENT: PERRL, anicteric sclera; oropharynx clear, MMM  Neck: supple, no appreciable JVD  Respiratory: CTA B/L; no W/R/R  Cardiovascular: +S1/S2, RRR  Gastrointestinal: soft, NT/ND  Extremities: WWP; no edema, clubbing or cyanosis  Vascular: 2+ radial and pedal pulses  Neurological: AAOx3; no focal deficits    LABS:                                RADIOLOGY & ADDITIONAL STUDIES: INTERVENTIONAL PULMONARY SERVICE INITIAL CONSULT NOTE    HPI:  This is an 86F with PMHx of gastric adenocarcinoma, recent history of TIA, GBS, lupus and lyme disease and recurrent transudative left sided pleural effusions who presents for gastrectomy planned for 1/27. Patient planned for surgery for gastric adenocarcinoma which was diagnosed on EGD in 10/2021 which showed a mass and 12/2021 which diagnosed poorly differentiated carcinoma with signet ring features. 1/2022 path with adenocarcinoma, moderately differentiated, intestinal type, immuno positive for CK-7, p53, neg CK20, ER and JULIANNA-3 , Her2/nestor. Patient has a history of left sided pleural effusion that began in June 2021 which has undergone 4 thoracentesis, most recently in 12/2021 which showed lymphocyte predominance (80%). Prior pleural effusion taps were transudative per her pulmonologist Dr. Lopez at [place]. Patient is able to walk 10-20 steps before developing shortness of breath. She does feel relief in the past after thoracentesis. Patient is a former smoker in her 20s for 5 years.     Fam Hx: suspected gallbladder cancer - mother    REVIEW OF SYSTEMS:  Constitutional: No fever, weight loss or fatigue  Eyes: No eye pain, visual disturbances, or discharge  ENMT:  No difficulty hearing, tinnitus, vertigo; No sinus or throat pain  Neck: No pain, stiffness or neck swelling  Respiratory: see HPI  Cardiovascular: No chest pain, palpitations, dizziness or leg swelling  Gastrointestinal: No abdominal or epigastric pain. No nausea, vomiting or hematemesis; No diarrhea or constipation. No melena or hematochezia.  Genitourinary: No dysuria, frequency, hematuria or incontinence  Neurological: No headaches, memory loss, loss of strength, numbness or tremors  Skin: No itching, burning, rashes or lesions   Lymph Nodes: No enlarged glands  Endocrine: No heat or cold intolerance; No hair loss  Musculoskeletal: No joint pain or swelling; No muscle, back or extremity pain  Psychiatric: No depression, anxiety, mood swings or difficulty sleeping  Heme/Lymph: No easy bruising or bleeding gums  Allergy and Immunologic: No hives or eczema    PAST MEDICAL & SURGICAL HISTORY:  Stroke  H/O pulmonary valve disease  Malignancy  GBS (Guillain [place] syndrome)  LE (lupus erythematosus)  Pleural effusion, left  Gastric cancer  Anemia due to acute blood loss  Gastric ulcer  H/O Helicobacter infection  H/o Lyme disease  History of TIA due to embolism  No significant past surgical history    FAMILY HISTORY:  Family history of cancer of gallbladder (Mother)    SOCIAL HISTORY:  Smoking Status: [ ] Current, [X] Former, [ ] Never  Pack Years: quit 60 years ago    MEDICATIONS:  Other Medications:  lactated ringers. 1000 milliLiter(s) IV Continuous <Continuous>    Allergies  Allergy Status Unknown    Vital Signs Last 24 Hrs  T(C): 36.3 (26 Jan 2022 16:22), Max: 36.7 (26 Jan 2022 14:27)  T(F): 97.4 (26 Jan 2022 16:22), Max: 98 (26 Jan 2022 14:27)  HR: 90 (26 Jan 2022 16:22) (89 - 90)  BP: 126/71 (26 Jan 2022 16:22) (101/65 - 126/71)  RR: 17 (26 Jan 2022 16:22) (17 - 18)  SpO2: 96% (26 Jan 2022 16:22) (95% - 96%)    PHYSICAL EXAM:  Constitutional: WD  Head: NC/AT  EENT: PERRL, anicteric sclera; oropharynx clear, MMM  Neck: supple, no appreciable JVD  Respiratory: dec breath sounds L 2/3 lung fields; no W/R/R  Cardiovascular: +S1/S2, RRR  Gastrointestinal: soft, NT/ND  Extremities: WWP; no edema, clubbing or cyanosis  Vascular: 2+ radial and pedal pulses  Neurological: AAOx3; no focal deficits    LABS:    RADIOLOGY & ADDITIONAL STUDIES: Reviewed.  < from: CT Angio Heart and Coronaries w/ IV Cont (01.06.22 @ 17:26) >  Lungs: There is again passive atelectasis of a large portion of the left   lower lobe adjacent to pleural effusion. No change 5 mm solid nodule   right middle lobe. No change micronodule right upper lobe. Small tubular   opacity right upper lobe could represent an impacted bronchus. Linear   atelectasis right lower lobe. Subsegmental atelectasis lingula.    Pleural effusion: There is still a large left pleural effusion. Trace   right pleural effusion.    < end of copied text >

## 2022-01-27 ENCOUNTER — RESULT REVIEW (OUTPATIENT)
Age: 87
End: 2022-01-27

## 2022-01-27 ENCOUNTER — APPOINTMENT (OUTPATIENT)
Dept: SURGICAL ONCOLOGY | Facility: HOSPITAL | Age: 87
End: 2022-01-27

## 2022-01-27 LAB
ALBUMIN SERPL ELPH-MCNC: 3.7 G/DL — SIGNIFICANT CHANGE UP (ref 3.3–5)
ALP SERPL-CCNC: 72 U/L — SIGNIFICANT CHANGE UP (ref 40–120)
ALT FLD-CCNC: 79 U/L — HIGH (ref 10–45)
ANION GAP SERPL CALC-SCNC: 11 MMOL/L — SIGNIFICANT CHANGE UP (ref 5–17)
ANION GAP SERPL CALC-SCNC: 13 MMOL/L — SIGNIFICANT CHANGE UP (ref 5–17)
ANION GAP SERPL CALC-SCNC: 14 MMOL/L — SIGNIFICANT CHANGE UP (ref 5–17)
APTT BLD: 32.7 SEC — SIGNIFICANT CHANGE UP (ref 27.5–35.5)
AST SERPL-CCNC: 109 U/L — HIGH (ref 10–40)
B PERT IGG+IGM PNL SER: SIGNIFICANT CHANGE UP
BASE EXCESS BLDA CALC-SCNC: -0.9 MMOL/L — SIGNIFICANT CHANGE UP (ref -2–3)
BASE EXCESS BLDA CALC-SCNC: -17.5 MMOL/L — LOW (ref -2–3)
BASE EXCESS BLDA CALC-SCNC: -3.7 MMOL/L — LOW (ref -2–3)
BASE EXCESS BLDA CALC-SCNC: 0 MMOL/L — SIGNIFICANT CHANGE UP (ref -2–3)
BASE EXCESS BLDA CALC-SCNC: 0.1 MMOL/L — SIGNIFICANT CHANGE UP (ref -2–3)
BASE EXCESS BLDA CALC-SCNC: 2.9 MMOL/L — SIGNIFICANT CHANGE UP (ref -2–3)
BASE EXCESS BLDA CALC-SCNC: 9.3 MMOL/L — HIGH (ref -2–3)
BILIRUB SERPL-MCNC: 1 MG/DL — SIGNIFICANT CHANGE UP (ref 0.2–1.2)
BLD GP AB SCN SERPL QL: NEGATIVE — SIGNIFICANT CHANGE UP
BUN SERPL-MCNC: 16 MG/DL — SIGNIFICANT CHANGE UP (ref 7–23)
BUN SERPL-MCNC: 16 MG/DL — SIGNIFICANT CHANGE UP (ref 7–23)
BUN SERPL-MCNC: 17 MG/DL — SIGNIFICANT CHANGE UP (ref 7–23)
CA-I BLDA-SCNC: 0.92 MMOL/L — LOW (ref 1.15–1.33)
CA-I BLDA-SCNC: 0.96 MMOL/L — LOW (ref 1.15–1.33)
CA-I BLDA-SCNC: 1.01 MMOL/L — LOW (ref 1.15–1.33)
CA-I BLDA-SCNC: 1.11 MMOL/L — LOW (ref 1.15–1.33)
CA-I BLDA-SCNC: 1.22 MMOL/L — SIGNIFICANT CHANGE UP (ref 1.15–1.33)
CA-I BLDA-SCNC: 1.94 MMOL/L — CRITICAL HIGH (ref 1.15–1.33)
CALCIUM SERPL-MCNC: 8.4 MG/DL — SIGNIFICANT CHANGE UP (ref 8.4–10.5)
CALCIUM SERPL-MCNC: 8.6 MG/DL — SIGNIFICANT CHANGE UP (ref 8.4–10.5)
CALCIUM SERPL-MCNC: 9.2 MG/DL — SIGNIFICANT CHANGE UP (ref 8.4–10.5)
CHLORIDE SERPL-SCNC: 102 MMOL/L — SIGNIFICANT CHANGE UP (ref 96–108)
CHLORIDE SERPL-SCNC: 102 MMOL/L — SIGNIFICANT CHANGE UP (ref 96–108)
CHLORIDE SERPL-SCNC: 104 MMOL/L — SIGNIFICANT CHANGE UP (ref 96–108)
CO2 BLDA-SCNC: 16 MMOL/L — LOW (ref 19–24)
CO2 BLDA-SCNC: 22 MMOL/L — SIGNIFICANT CHANGE UP (ref 19–24)
CO2 BLDA-SCNC: 24 MMOL/L — SIGNIFICANT CHANGE UP (ref 19–24)
CO2 BLDA-SCNC: 25 MMOL/L — HIGH (ref 19–24)
CO2 BLDA-SCNC: 26 MMOL/L — HIGH (ref 19–24)
CO2 BLDA-SCNC: 27 MMOL/L — HIGH (ref 19–24)
CO2 BLDA-SCNC: 34 MMOL/L — HIGH (ref 19–24)
CO2 SERPL-SCNC: 21 MMOL/L — LOW (ref 22–31)
CO2 SERPL-SCNC: 22 MMOL/L — SIGNIFICANT CHANGE UP (ref 22–31)
CO2 SERPL-SCNC: 24 MMOL/L — SIGNIFICANT CHANGE UP (ref 22–31)
COHGB MFR BLDA: 0 % — SIGNIFICANT CHANGE UP
COHGB MFR BLDA: 0 % — SIGNIFICANT CHANGE UP
COHGB MFR BLDA: 0.1 % — SIGNIFICANT CHANGE UP
COHGB MFR BLDA: 0.3 % — SIGNIFICANT CHANGE UP
COLOR FLD: YELLOW — SIGNIFICANT CHANGE UP
COMMENT - FLUIDS: SIGNIFICANT CHANGE UP
CREAT SERPL-MCNC: 0.98 MG/DL — SIGNIFICANT CHANGE UP (ref 0.5–1.3)
CREAT SERPL-MCNC: 1 MG/DL — SIGNIFICANT CHANGE UP (ref 0.5–1.3)
CREAT SERPL-MCNC: 1.02 MG/DL — SIGNIFICANT CHANGE UP (ref 0.5–1.3)
FLUID INTAKE SUBSTANCE CLASS: SIGNIFICANT CHANGE UP
FLUID SEGMENTED GRANULOCYTES: 2 % — SIGNIFICANT CHANGE UP
GAS PNL BLDA: SIGNIFICANT CHANGE UP
GAS PNL BLDA: SIGNIFICANT CHANGE UP
GLUCOSE BLDA-MCNC: 103 MG/DL — HIGH (ref 70–99)
GLUCOSE BLDA-MCNC: 146 MG/DL — HIGH (ref 70–99)
GLUCOSE BLDA-MCNC: 87 MG/DL — SIGNIFICANT CHANGE UP (ref 70–99)
GLUCOSE BLDA-MCNC: 87 MG/DL — SIGNIFICANT CHANGE UP (ref 70–99)
GLUCOSE BLDA-MCNC: 92 MG/DL — SIGNIFICANT CHANGE UP (ref 70–99)
GLUCOSE BLDA-MCNC: 93 MG/DL — SIGNIFICANT CHANGE UP (ref 70–99)
GLUCOSE SERPL-MCNC: 116 MG/DL — HIGH (ref 70–99)
GLUCOSE SERPL-MCNC: 147 MG/DL — HIGH (ref 70–99)
GLUCOSE SERPL-MCNC: 76 MG/DL — SIGNIFICANT CHANGE UP (ref 70–99)
HCO3 BLDA-SCNC: 14 MMOL/L — LOW (ref 21–28)
HCO3 BLDA-SCNC: 21 MMOL/L — SIGNIFICANT CHANGE UP (ref 21–28)
HCO3 BLDA-SCNC: 23 MMOL/L — SIGNIFICANT CHANGE UP (ref 21–28)
HCO3 BLDA-SCNC: 24 MMOL/L — SIGNIFICANT CHANGE UP (ref 21–28)
HCO3 BLDA-SCNC: 25 MMOL/L — SIGNIFICANT CHANGE UP (ref 21–28)
HCO3 BLDA-SCNC: 25 MMOL/L — SIGNIFICANT CHANGE UP (ref 21–28)
HCO3 BLDA-SCNC: 32 MMOL/L — HIGH (ref 21–28)
HCT VFR BLD CALC: 29.3 % — LOW (ref 34.5–45)
HCT VFR BLD CALC: 33.4 % — LOW (ref 34.5–45)
HGB BLD-MCNC: 10.6 G/DL — LOW (ref 11.5–15.5)
HGB BLD-MCNC: 9.7 G/DL — LOW (ref 11.5–15.5)
HGB BLDA-MCNC: 10.6 G/DL — LOW (ref 11.7–16.1)
HGB BLDA-MCNC: 7.3 G/DL — LOW (ref 11.7–16.1)
HGB BLDA-MCNC: 7.7 G/DL — LOW (ref 11.7–16.1)
HGB BLDA-MCNC: 7.8 G/DL — LOW (ref 11.7–16.1)
HGB BLDA-MCNC: 7.9 G/DL — LOW (ref 11.7–16.1)
HGB BLDA-MCNC: 8.7 G/DL — LOW (ref 11.7–16.1)
INR BLD: 1.18 — HIGH (ref 0.88–1.16)
ISTAT VENOUS BE: 2 MMOL/L — SIGNIFICANT CHANGE UP (ref -2–3)
ISTAT VENOUS GLUCOSE: 81 MG/DL — SIGNIFICANT CHANGE UP (ref 70–99)
ISTAT VENOUS HCO3: 28 MMOL/L — SIGNIFICANT CHANGE UP (ref 23–28)
ISTAT VENOUS HEMATOCRIT: 30 % — LOW (ref 34.5–45)
ISTAT VENOUS HEMOGLOBIN: 10.2 GM/DL — LOW (ref 11.5–15.5)
ISTAT VENOUS IONIZED CALCIUM: 1.26 MMOL/L — SIGNIFICANT CHANGE UP (ref 1.12–1.3)
ISTAT VENOUS PCO2: 49 MMHG — SIGNIFICANT CHANGE UP (ref 41–51)
ISTAT VENOUS PH: 7.37 — SIGNIFICANT CHANGE UP (ref 7.31–7.41)
ISTAT VENOUS PO2: <66 MMHG — LOW (ref 35–40)
ISTAT VENOUS POTASSIUM: 4.4 MMOL/L — SIGNIFICANT CHANGE UP (ref 3.5–5.3)
ISTAT VENOUS SO2: 24 % — SIGNIFICANT CHANGE UP
ISTAT VENOUS SODIUM: 139 MMOL/L — SIGNIFICANT CHANGE UP (ref 135–145)
ISTAT VENOUS TCO2: 30 MMOL/L — SIGNIFICANT CHANGE UP (ref 22–31)
LACTATE SERPL-SCNC: 0.9 MMOL/L — SIGNIFICANT CHANGE UP (ref 0.5–2)
LACTATE SERPL-SCNC: 1.7 MMOL/L — SIGNIFICANT CHANGE UP (ref 0.5–2)
LYMPHOCYTES # FLD: 77 % — SIGNIFICANT CHANGE UP
MAGNESIUM SERPL-MCNC: 1.6 MG/DL — SIGNIFICANT CHANGE UP (ref 1.6–2.6)
MAGNESIUM SERPL-MCNC: 1.8 MG/DL — SIGNIFICANT CHANGE UP (ref 1.6–2.6)
MCHC RBC-ENTMCNC: 25.3 PG — LOW (ref 27–34)
MCHC RBC-ENTMCNC: 25.7 PG — LOW (ref 27–34)
MCHC RBC-ENTMCNC: 31.7 GM/DL — LOW (ref 32–36)
MCHC RBC-ENTMCNC: 33.1 GM/DL — SIGNIFICANT CHANGE UP (ref 32–36)
MCV RBC AUTO: 77.5 FL — LOW (ref 80–100)
MCV RBC AUTO: 79.7 FL — LOW (ref 80–100)
MESOTHL CELL # FLD: 1 % — SIGNIFICANT CHANGE UP
METHGB MFR BLDA: 0 % — SIGNIFICANT CHANGE UP
METHGB MFR BLDA: 0.2 % — SIGNIFICANT CHANGE UP
METHGB MFR BLDA: 0.2 % — SIGNIFICANT CHANGE UP
METHGB MFR BLDA: 0.3 % — SIGNIFICANT CHANGE UP
MONOS+MACROS # FLD: 20 % — SIGNIFICANT CHANGE UP
NIGHT BLUE STAIN TISS: SIGNIFICANT CHANGE UP
NRBC # BLD: 0 /100 WBCS — SIGNIFICANT CHANGE UP (ref 0–0)
NRBC # BLD: 0 /100 WBCS — SIGNIFICANT CHANGE UP (ref 0–0)
NT-PROBNP SERPL-SCNC: 199 PG/ML — SIGNIFICANT CHANGE UP (ref 0–300)
OXYHGB MFR BLDA: 97.8 % — HIGH (ref 90–95)
OXYHGB MFR BLDA: 98.1 % — HIGH (ref 90–95)
OXYHGB MFR BLDA: 98.2 % — HIGH (ref 90–95)
OXYHGB MFR BLDA: 98.3 % — HIGH (ref 90–95)
OXYHGB MFR BLDA: 98.3 % — HIGH (ref 90–95)
OXYHGB MFR BLDA: 98.6 % — HIGH (ref 90–95)
PCO2 BLDA: 21 MMHG — LOW (ref 32–35)
PCO2 BLDA: 24 MMHG — LOW (ref 32–35)
PCO2 BLDA: 35 MMHG — SIGNIFICANT CHANGE UP (ref 32–35)
PCO2 BLDA: 37 MMHG — HIGH (ref 32–35)
PCO2 BLDA: 40 MMHG — HIGH (ref 32–35)
PCO2 BLDA: 64 MMHG — HIGH (ref 32–35)
PCO2 BLDA: 69 MMHG — HIGH (ref 32–35)
PH BLDA: 6.92 — CRITICAL LOW (ref 7.35–7.45)
PH BLDA: 7.2 — CRITICAL LOW (ref 7.35–7.45)
PH BLDA: 7.4 — SIGNIFICANT CHANGE UP (ref 7.35–7.45)
PH BLDA: 7.43 — SIGNIFICANT CHANGE UP (ref 7.35–7.45)
PH BLDA: 7.55 — HIGH (ref 7.35–7.45)
PH BLDA: 7.61 — CRITICAL HIGH (ref 7.35–7.45)
PH BLDA: 7.61 — CRITICAL HIGH (ref 7.35–7.45)
PHOSPHATE SERPL-MCNC: 4.1 MG/DL — SIGNIFICANT CHANGE UP (ref 2.5–4.5)
PHOSPHATE SERPL-MCNC: 5.1 MG/DL — HIGH (ref 2.5–4.5)
PLATELET # BLD AUTO: 147 K/UL — LOW (ref 150–400)
PLATELET # BLD AUTO: 175 K/UL — SIGNIFICANT CHANGE UP (ref 150–400)
PO2 BLDA: 229 MMHG — HIGH (ref 83–108)
PO2 BLDA: 259 MMHG — HIGH (ref 83–108)
PO2 BLDA: 296 MMHG — HIGH (ref 83–108)
PO2 BLDA: 296 MMHG — HIGH (ref 83–108)
PO2 BLDA: 314 MMHG — HIGH (ref 83–108)
PO2 BLDA: 319 MMHG — HIGH (ref 83–108)
PO2 BLDA: 407 MMHG — HIGH (ref 83–108)
POTASSIUM BLDA-SCNC: 3.8 MMOL/L — SIGNIFICANT CHANGE UP (ref 3.5–5.1)
POTASSIUM BLDA-SCNC: 3.9 MMOL/L — SIGNIFICANT CHANGE UP (ref 3.5–5.1)
POTASSIUM BLDA-SCNC: 4 MMOL/L — SIGNIFICANT CHANGE UP (ref 3.5–5.1)
POTASSIUM BLDA-SCNC: 4.1 MMOL/L — SIGNIFICANT CHANGE UP (ref 3.5–5.1)
POTASSIUM SERPL-MCNC: 3.8 MMOL/L — SIGNIFICANT CHANGE UP (ref 3.5–5.3)
POTASSIUM SERPL-MCNC: 4.1 MMOL/L — SIGNIFICANT CHANGE UP (ref 3.5–5.3)
POTASSIUM SERPL-MCNC: 4.9 MMOL/L — SIGNIFICANT CHANGE UP (ref 3.5–5.3)
POTASSIUM SERPL-SCNC: 3.8 MMOL/L — SIGNIFICANT CHANGE UP (ref 3.5–5.3)
POTASSIUM SERPL-SCNC: 4.1 MMOL/L — SIGNIFICANT CHANGE UP (ref 3.5–5.3)
POTASSIUM SERPL-SCNC: 4.9 MMOL/L — SIGNIFICANT CHANGE UP (ref 3.5–5.3)
PROT SERPL-MCNC: 7.1 G/DL — SIGNIFICANT CHANGE UP (ref 6–8.3)
PROTHROM AB SERPL-ACNC: 14.1 SEC — HIGH (ref 10.6–13.6)
RBC # BLD: 3.78 M/UL — LOW (ref 3.8–5.2)
RBC # BLD: 4.19 M/UL — SIGNIFICANT CHANGE UP (ref 3.8–5.2)
RBC # FLD: 15.2 % — HIGH (ref 10.3–14.5)
RBC # FLD: 15.4 % — HIGH (ref 10.3–14.5)
RCV VOL RI: 1000 /UL — HIGH (ref 0–0)
RH IG SCN BLD-IMP: POSITIVE — SIGNIFICANT CHANGE UP
SAO2 % BLDA: 98.1 % — HIGH (ref 94–98)
SAO2 % BLDA: 98.3 % — HIGH (ref 94–98)
SAO2 % BLDA: 98.3 % — HIGH (ref 94–98)
SAO2 % BLDA: 98.6 % — HIGH (ref 94–98)
SAO2 % BLDA: 98.6 % — HIGH (ref 94–98)
SAO2 % BLDA: 98.8 % — HIGH (ref 94–98)
SAO2 % BLDA: 98.9 % — HIGH (ref 94–98)
SODIUM BLDA-SCNC: 135 MMOL/L — LOW (ref 136–145)
SODIUM BLDA-SCNC: 137 MMOL/L — SIGNIFICANT CHANGE UP (ref 136–145)
SODIUM BLDA-SCNC: 138 MMOL/L — SIGNIFICANT CHANGE UP (ref 136–145)
SODIUM BLDA-SCNC: 139 MMOL/L — SIGNIFICANT CHANGE UP (ref 136–145)
SODIUM BLDA-SCNC: 140 MMOL/L — SIGNIFICANT CHANGE UP (ref 136–145)
SODIUM BLDA-SCNC: 145 MMOL/L — SIGNIFICANT CHANGE UP (ref 136–145)
SODIUM SERPL-SCNC: 135 MMOL/L — SIGNIFICANT CHANGE UP (ref 135–145)
SODIUM SERPL-SCNC: 139 MMOL/L — SIGNIFICANT CHANGE UP (ref 135–145)
SODIUM SERPL-SCNC: 139 MMOL/L — SIGNIFICANT CHANGE UP (ref 135–145)
SPECIMEN SOURCE FLD: SIGNIFICANT CHANGE UP
SPECIMEN SOURCE: SIGNIFICANT CHANGE UP
TOTAL NUCLEATED CELL COUNT, BODY FLUID: 143 /UL — SIGNIFICANT CHANGE UP
TRIGL FLD-MCNC: 15 MG/DL — SIGNIFICANT CHANGE UP
TUBE TYPE: SIGNIFICANT CHANGE UP
WBC # BLD: 12.95 K/UL — HIGH (ref 3.8–10.5)
WBC # BLD: 8.3 K/UL — SIGNIFICANT CHANGE UP (ref 3.8–10.5)
WBC # FLD AUTO: 12.95 K/UL — HIGH (ref 3.8–10.5)
WBC # FLD AUTO: 8.3 K/UL — SIGNIFICANT CHANGE UP (ref 3.8–10.5)

## 2022-01-27 PROCEDURE — 88305 TISSUE EXAM BY PATHOLOGIST: CPT | Mod: 26

## 2022-01-27 PROCEDURE — 38589 UNLISTED LAPS PX LYMPHTC SYS: CPT

## 2022-01-27 PROCEDURE — 88342 IMHCHEM/IMCYTCHM 1ST ANTB: CPT | Mod: 26

## 2022-01-27 PROCEDURE — 99291 CRITICAL CARE FIRST HOUR: CPT

## 2022-01-27 PROCEDURE — 43659 UNLISTED LAPS PX STOMACH: CPT

## 2022-01-27 PROCEDURE — 88309 TISSUE EXAM BY PATHOLOGIST: CPT | Mod: 26

## 2022-01-27 PROCEDURE — 71045 X-RAY EXAM CHEST 1 VIEW: CPT | Mod: 26,77

## 2022-01-27 PROCEDURE — 71045 X-RAY EXAM CHEST 1 VIEW: CPT | Mod: 26,76

## 2022-01-27 PROCEDURE — 88341 IMHCHEM/IMCYTCHM EA ADD ANTB: CPT | Mod: 26

## 2022-01-27 DEVICE — STAPLER COVIDIEN TRI-STAPLE 60MM PURPLE RELOAD: Type: IMPLANTABLE DEVICE | Status: FUNCTIONAL

## 2022-01-27 RX ORDER — ENOXAPARIN SODIUM 100 MG/ML
40 INJECTION SUBCUTANEOUS EVERY 24 HOURS
Refills: 0 | Status: DISCONTINUED | OUTPATIENT
Start: 2022-01-27 | End: 2022-01-28

## 2022-01-27 RX ORDER — CHLORHEXIDINE GLUCONATE 213 G/1000ML
15 SOLUTION TOPICAL EVERY 12 HOURS
Refills: 0 | Status: DISCONTINUED | OUTPATIENT
Start: 2022-01-27 | End: 2022-01-28

## 2022-01-27 RX ORDER — BENZOCAINE AND MENTHOL 5; 1 G/100ML; G/100ML
1 LIQUID ORAL EVERY 6 HOURS
Refills: 0 | Status: DISCONTINUED | OUTPATIENT
Start: 2022-01-27 | End: 2022-02-02

## 2022-01-27 RX ORDER — CHLORHEXIDINE GLUCONATE 213 G/1000ML
1 SOLUTION TOPICAL
Refills: 0 | Status: DISCONTINUED | OUTPATIENT
Start: 2022-01-27 | End: 2022-02-02

## 2022-01-27 RX ORDER — BUPIVACAINE 13.3 MG/ML
20 INJECTION, SUSPENSION, LIPOSOMAL INFILTRATION ONCE
Refills: 0 | Status: DISCONTINUED | OUTPATIENT
Start: 2022-01-27 | End: 2022-01-27

## 2022-01-27 RX ORDER — ACETAMINOPHEN 500 MG
1000 TABLET ORAL ONCE
Refills: 0 | Status: COMPLETED | OUTPATIENT
Start: 2022-01-27 | End: 2022-01-28

## 2022-01-27 RX ORDER — SODIUM CHLORIDE 9 MG/ML
1000 INJECTION, SOLUTION INTRAVENOUS
Refills: 0 | Status: DISCONTINUED | OUTPATIENT
Start: 2022-01-27 | End: 2022-01-29

## 2022-01-27 RX ORDER — MAGNESIUM SULFATE 500 MG/ML
2 VIAL (ML) INJECTION EVERY 6 HOURS
Refills: 0 | Status: COMPLETED | OUTPATIENT
Start: 2022-01-27 | End: 2022-01-27

## 2022-01-27 RX ORDER — PROPOFOL 10 MG/ML
10 INJECTION, EMULSION INTRAVENOUS
Qty: 1000 | Refills: 0 | Status: DISCONTINUED | OUTPATIENT
Start: 2022-01-27 | End: 2022-01-28

## 2022-01-27 RX ORDER — FENTANYL CITRATE 50 UG/ML
0.5 INJECTION INTRAVENOUS
Qty: 2500 | Refills: 0 | Status: DISCONTINUED | OUTPATIENT
Start: 2022-01-27 | End: 2022-01-28

## 2022-01-27 RX ORDER — ONDANSETRON 8 MG/1
4 TABLET, FILM COATED ORAL EVERY 4 HOURS
Refills: 0 | Status: DISCONTINUED | OUTPATIENT
Start: 2022-01-27 | End: 2022-02-02

## 2022-01-27 RX ADMIN — Medication 25 GRAM(S): at 14:34

## 2022-01-27 RX ADMIN — CHLORHEXIDINE GLUCONATE 15 MILLILITER(S): 213 SOLUTION TOPICAL at 20:22

## 2022-01-27 RX ADMIN — FENTANYL CITRATE 2.54 MICROGRAM(S)/KG/HR: 50 INJECTION INTRAVENOUS at 15:51

## 2022-01-27 RX ADMIN — FENTANYL CITRATE 2.54 MICROGRAM(S)/KG/HR: 50 INJECTION INTRAVENOUS at 20:57

## 2022-01-27 RX ADMIN — Medication 25 GRAM(S): at 20:22

## 2022-01-27 RX ADMIN — GABAPENTIN 300 MILLIGRAM(S): 400 CAPSULE ORAL at 05:00

## 2022-01-27 RX ADMIN — PROPOFOL 3.05 MICROGRAM(S)/KG/MIN: 10 INJECTION, EMULSION INTRAVENOUS at 15:32

## 2022-01-27 RX ADMIN — PROPOFOL 3.05 MICROGRAM(S)/KG/MIN: 10 INJECTION, EMULSION INTRAVENOUS at 20:57

## 2022-01-27 RX ADMIN — ENOXAPARIN SODIUM 40 MILLIGRAM(S): 100 INJECTION SUBCUTANEOUS at 23:13

## 2022-01-27 NOTE — CONSULT NOTE ADULT - ATTENDING COMMENTS
acute postoperative hypoxic and hypercapneic respiratory failure after gastrectomy in patient with h/o left pleural effusion, SLE  Concern for prolonged effect of anesthesia or possibly rocuronium  Keep intubated overnight with precedex sedation  Resume plaquenil after extubation when can take po  IV RL 60/hr  Continue left chest tube to gravity drainage, transudate  SCDs, SQ heparin when cleared by surgery  AC12  (8ml/kg IBW), 40% PEEP 5
Left pleff s/p multiple drainages.  Second and third tap resulted from first traumatic drainage causing hemothorax. Last drainage was consistent with transudative fluid.  Plan for CT placement to assist with perioperative fluid drainage.

## 2022-01-27 NOTE — CHART NOTE - NSCHARTNOTEFT_GEN_A_CORE
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 Patient subsequently underwent elective distal gastrectomy with billroth II reconstruction. Intra-operatively, patient's serum pH noted to be 6.9. Patient transferred to PACU in stable condition after corrective measures enacted by Anesthesia in the OR. Repeat serum pH 7.4 post-operative. Per Anesthesia NP, patient's oxygen saturation noted to be 60% in PACU, upon inspection patient noted to be minimally responsive. Rapid response called and Anesthesia brought to bedside. Anesthesia assessed airway. Patient's blood pressure taken and appreciated as 220+ systolic, 100+ diastolic with associated heart rate of 115. Patient exam showing minimal response to verbal stimuli (eye-opening when prompted). Left chest tube in place actively collecting serous fluid. NATALYA drain in place and passively collecting serosanguinous material. Abdominal incisions c/d/i. Abdomen soft and distended, although equivocal given body habitus. Patient ventilated with bag-valve mask at bedside until re-intubation performed bedside. Surgery Chief present at bedside. Stat labs ordered and collected including repeat arterial blood gas and lactate. Immediate chest xray obtained. SICU called to bedside for evaluation and transfer of care. Patient ventilated at rate of 12 with PEEP 5, tidal volume 450cc.

## 2022-01-27 NOTE — CONSULT NOTE ADULT - SUBJECTIVE AND OBJECTIVE BOX
SICU CONSULT NOTE    HPI:  85 yo female with hx of TIA, lyme diseases, lupus, Guillain [place] Syndrome and gastric adenocarcinoma presenting with a pleaural effusion prior to surgery. The effusion has been drained several times before - last time in November Becomes short of breath when walking 20 steps, sleeps on 2-3 pillows. She denies dysphagia, nausea/vomiting, last BM yesterday.   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, moderately differentiated, intestinal type, immuno possitive for CK-7, p53, neg CK20, ER and JULIANNA -3 , Her2/nestor   Colonoscopy - 5 years ago (normal)    PMH: lyme disease, lupus, Guillain [place] Syndrome, COVID feb 2021  PSH: none  Fam Hx: suspected gallbladder cancer - mother  Soc: smoker in 20s for 5 years - pack a day, no alcohol, no drugs  Meds: ASA 81mg, gabapentin 300 mg BID, hydroxychloroquine 200mg, rosuvastatin 20mg, duloxetine 20mg, alendronate 20mg weekly, latanoprost 0.005% nightly   (26 Jan 2022 16:16)      SICU ADDENDUM:     PAST MEDICAL & SURGICAL HISTORY:  Stroke    H/O pulmonary valve disease    Malignancy    GBS (Guillain [place] syndrome)    LE (lupus erythematosus)    Pleural effusion, left    Gastric cancer    Anemia due to acute blood loss    Gastric ulcer    H/O Helicobacter infection    H/o Lyme disease    History of TIA due to embolism    No significant past surgical history        PAST SURGICAL HISTORY:     REVIEW OF SYSTEMS:   Pertinent positives/negatives noted in HPI.     HOME MEDICATIONS:  Home Medications:  alendronate weekly: 20 milligram(s) orally once a week (26 Jan 2022 17:00)  aspirin 81 mg oral tablet, chewable: 1 tab(s) orally once a day (26 Jan 2022 17:00)  DULoxetine 20 mg oral delayed release capsule: orally once a day (26 Jan 2022 17:00)  gabapentin 300 mg oral capsule: 1 cap(s) orally 2 times a day (26 Jan 2022 17:00)  hydroxychloroquine 200 mg oral tablet: 1 tab(s) orally once a day (26 Jan 2022 17:00)  latanoprost 0.005% ophthalmic solution: 1 drop(s) to each affected eye once a day (in the evening) (26 Jan 2022 17:00)  rosuvastatin 20 mg oral tablet: 1 tab(s) orally once a day (26 Jan 2022 17:00)      ALLERGIES:  Allergies    Allergy Status Unknown    Intolerances        SOCIAL HISTORY:     FAMILY HISTORY:  Family history of cancer of gallbladder (Mother)        Vital Signs Last 24 Hrs  T(C): 35.9 (27 Jan 2022 13:16), Max: 36.9 (26 Jan 2022 23:58)  T(F): 96.7 (27 Jan 2022 13:16), Max: 98.4 (26 Jan 2022 23:58)  HR: 103 (27 Jan 2022 14:01) (82 - 107)  BP: 113/55 (27 Jan 2022 14:01) (104/51 - 151/94)  BP(mean): 79 (27 Jan 2022 14:01) (74 - 82)  RR: 17 (27 Jan 2022 14:01) (16 - 33)  SpO2: 100% (27 Jan 2022 14:01) (94% - 100%)    PHYSICAL EXAM:  T(C): 35.9 (01-27-22 @ 13:16), Max: 36.9 (01-26-22 @ 23:58)  HR: 103 (01-27-22 @ 14:01) (82 - 107)  BP: 113/55 (01-27-22 @ 14:01) (104/51 - 151/94)  RR: 17 (01-27-22 @ 14:01) (16 - 33)  SpO2: 100% (01-27-22 @ 14:01) (94% - 100%)    GENERAL: NAD, Resting comfortably in bed, awake, opens eyes spontaneously  HEENT: NCAT, MMM, Normal conjunctiva, PERRL  RESP: Nonlabored breathing, No respiratory distress  CARD: Normal rate, Normal peripheral perfusion  GI: Soft, ND, NT, No guarding, No rebound tenderness  EXTREM: WWP, No edema, No gross deformity of extremities  SKIN: No rashes, no lesions  NEURO: AAOx3, No focal motor or sensory deficits  PSYCH: Affect and characteristics of appearance, verbalizations, and behaviors are appropriate    LABS:                        10.6   12.95 )-----------( 175      ( 27 Jan 2022 15:00 )             33.4     01-27    139  |  102  |  16  ----------------------------<  x   3.8   |  24  |  1.00    Ca    8.6      27 Jan 2022 15:00  Phos  4.1     01-27  Mg     1.8     01-27      PT/INR - ( 27 Jan 2022 15:00 )   PT: 14.1 sec;   INR: 1.18          PTT - ( 27 Jan 2022 15:00 )  PTT:32.7 sec        NEURO:  CV: goal MAP >65  PULM:  GI/FEN: NPO, LR@  : marie De Souza  ENDO: ISS  HEME: Hgb  ID: WBC  PPx:  LINES: PIV  WOUNDS/DRAINS:  PT/OT: Not ordered   SICU CONSULT NOTE    HPI:  85 yo female with hx of TIA, lyme diseases, lupus, Guillain [place] Syndrome and gastric adenocarcinoma presenting with a pleaural effusion prior to surgery. The effusion has been drained several times before - last time in November Becomes short of breath when walking 20 steps, sleeps on 2-3 pillows. She denies dysphagia, nausea/vomiting, last BM yesterday.   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, moderately differentiated, intestinal type, immuno possitive for CK-7, p53, neg CK20, ER and JULIANNA -3 , Her2/nestor   Colonoscopy - 5 years ago (normal)    PMH: lyme disease, lupus, Guillain [place] Syndrome, COVID feb 2021  PSH: none  Fam Hx: suspected gallbladder cancer - mother  Soc: smoker in 20s for 5 years - pack a day, no alcohol, no drugs  Meds: ASA 81mg, gabapentin 300 mg BID, hydroxychloroquine 200mg, rosuvastatin 20mg, duloxetine 20mg, alendronate 20mg weekly, latanoprost 0.005% nightly   (26 Jan 2022 16:16)      SICU ADDENDUM:     PAST MEDICAL & SURGICAL HISTORY:  Stroke    H/O pulmonary valve disease    Malignancy    GBS (Guillain [place] syndrome)    LE (lupus erythematosus)    Pleural effusion, left    Gastric cancer    Anemia due to acute blood loss    Gastric ulcer    H/O Helicobacter infection    H/o Lyme disease    History of TIA due to embolism    No significant past surgical history        PAST SURGICAL HISTORY:     REVIEW OF SYSTEMS:   Pertinent positives/negatives noted in HPI.     HOME MEDICATIONS:  Home Medications:  alendronate weekly: 20 milligram(s) orally once a week (26 Jan 2022 17:00)  aspirin 81 mg oral tablet, chewable: 1 tab(s) orally once a day (26 Jan 2022 17:00)  DULoxetine 20 mg oral delayed release capsule: orally once a day (26 Jan 2022 17:00)  gabapentin 300 mg oral capsule: 1 cap(s) orally 2 times a day (26 Jan 2022 17:00)  hydroxychloroquine 200 mg oral tablet: 1 tab(s) orally once a day (26 Jan 2022 17:00)  latanoprost 0.005% ophthalmic solution: 1 drop(s) to each affected eye once a day (in the evening) (26 Jan 2022 17:00)  rosuvastatin 20 mg oral tablet: 1 tab(s) orally once a day (26 Jan 2022 17:00)      ALLERGIES:  Allergies    Allergy Status Unknown    Intolerances        SOCIAL HISTORY:     FAMILY HISTORY:  Family history of cancer of gallbladder (Mother)        Vital Signs Last 24 Hrs  T(C): 35.9 (27 Jan 2022 13:16), Max: 36.9 (26 Jan 2022 23:58)  T(F): 96.7 (27 Jan 2022 13:16), Max: 98.4 (26 Jan 2022 23:58)  HR: 103 (27 Jan 2022 14:01) (82 - 107)  BP: 113/55 (27 Jan 2022 14:01) (104/51 - 151/94)  BP(mean): 79 (27 Jan 2022 14:01) (74 - 82)  RR: 17 (27 Jan 2022 14:01) (16 - 33)  SpO2: 100% (27 Jan 2022 14:01) (94% - 100%)    PHYSICAL EXAM:  T(C): 35.9 (01-27-22 @ 13:16), Max: 36.9 (01-26-22 @ 23:58)  HR: 103 (01-27-22 @ 14:01) (82 - 107)  BP: 113/55 (01-27-22 @ 14:01) (104/51 - 151/94)  RR: 17 (01-27-22 @ 14:01) (16 - 33)  SpO2: 100% (01-27-22 @ 14:01) (94% - 100%)    GENERAL: NAD, Resting comfortably in bed, awake, opens eyes spontaneously  HEENT: NCAT, MMM, Normal conjunctiva, PERRL  RESP: Nonlabored breathing, No respiratory distress with clear lungs, left chest tuve  CARD: Regular rate, S1S2  GI: Soft, ND, NT, No guarding, No rebound tenderness, clean wound  EXTREM: WWP, No edema,   VASC: 2+ pulses  SKIN: No rashes, no lesions  NEURO: moves extremities      LABS:                        10.6   12.95 )-----------( 175      ( 27 Jan 2022 15:00 )             33.4     01-27    139  |  102  |  16  ----------------------------<  x   3.8   |  24  |  1.00    Ca    8.6      27 Jan 2022 15:00  Phos  4.1     01-27  Mg     1.8     01-27      PT/INR - ( 27 Jan 2022 15:00 )   PT: 14.1 sec;   INR: 1.18          PTT - ( 27 Jan 2022 15:00 )  PTT:32.7 sec        NEURO:  CV: goal MAP >65  PULM:  GI/FEN: NPO, LR@  : marie De Souza  ENDO: ISS  HEME: Hgb  ID: WBC  PPx:  LINES: PIV  WOUNDS/DRAINS:  PT/OT: Not ordered

## 2022-01-27 NOTE — PROGRESS NOTE ADULT - SUBJECTIVE AND OBJECTIVE BOX
STATUS POST:    Chest tube insertion 1/26    SUBJECTIVE:  Examined at bedside this morning, she feels well; her pain is well-controlled. No nausea or vomiting. Left chest tube secured in place. Patient NPO for OR today.  No acute complaints.    MEDICATIONS  (STANDING):    MEDICATIONS  (PRN):      Vital Signs Last 24 Hrs  T(C): 36.7 (27 Jan 2022 07:23), Max: 36.9 (26 Jan 2022 23:58)  T(F): 98 (27 Jan 2022 07:23), Max: 98.4 (26 Jan 2022 23:58)  HR: 98 (27 Jan 2022 07:23) (82 - 98)  BP: 134/74 (27 Jan 2022 07:23) (101/65 - 151/94)  BP(mean): --  RR: 16 (27 Jan 2022 07:23) (16 - 18)  SpO2: 98% (27 Jan 2022 07:23) (94% - 98%)    PHYSICAL EXAM:    Constitutional: A&Ox3    Respiratory: non labored breathing, no respiratory distress    Cardiovascular: NSR, RRR    Gastrointestinal: soft, non-tender, non distended, no guarding, no rebound    Extremities: (-) edema, wwp                  I&O's Detail    26 Jan 2022 07:01  -  27 Jan 2022 07:00  --------------------------------------------------------  IN:    Lactated Ringers: 260 mL  Total IN: 260 mL    OUT:    Chest Tube (mL): 640 mL    Voided (mL): 750 mL  Total OUT: 1390 mL    Total NET: -1130 mL          LABS:                        9.5    6.81  )-----------( 175      ( 26 Jan 2022 16:49 )             28.8     01-27    135  |  102  |  17  ----------------------------<  76  4.9   |  22  |  1.02    Ca    9.2      27 Jan 2022 07:49      PT/INR - ( 26 Jan 2022 16:49 )   PT: 13.1 sec;   INR: 1.10          PTT - ( 26 Jan 2022 16:49 )  PTT:31.8 sec      RADIOLOGY & ADDITIONAL STUDIES:

## 2022-01-27 NOTE — PROGRESS NOTE ADULT - ASSESSMENT
85 yo female with hx of lyme diseases, lupus and gastric adenocarcinoma presenting with a pleural effusion prior to surgery. Admitted to [facility] for observation and pleural effusion drainage prior to surgery tomorrow (1/27).     OR today  NPO/IVF  Nausea/Vomiting PRN  Lovenox/OOBA/IS  Pulm recs

## 2022-01-27 NOTE — BRIEF OPERATIVE NOTE - OPERATION/FINDINGS
Procedure: Laparoscopic distal gastrectomy, D1/D2 lymphadenectomy, Billroth 2 reconstruction  Indication: Gastric adenocarcinoma  Description: Viktoria entry at umbilicus. Three additional 5 mm ports placed. Mass noted in antrum of stomach. Lesser sac entered by dividing gastrocolic ligament with cautery, extended along the greater curvature of the stomach. Gastrohepatic ligament divided and extended to proximal line of gastrectomy. Right gastric artery identified, clipped, and ligated. D1 and D2 lymphadenectomy performed. Adhesions around proximal duodenum divided and then duodenum was transected using endoGIA 60 mm purple load. Upper midline incision created and stomach and jejunum exteriorized. Proximal aspect of stomach divided using EndoGIA 60 mm purple load about 6 cm from palpated mass. Jejunum measured approximately 15 cm from LOT and a Billroth 2 anastomosis was created (handsewn in 2 layers using 3-0 PDS). Leak test negative, NG tube left in place. 19F NATALYA drain placed overlying duodenal stump and B2 anastomosis. Fascia closed with 0 looped PDS. Skin closed with 4-0 monocryl and dermabond.

## 2022-01-27 NOTE — CHART NOTE - NSCHARTNOTEFT_GEN_A_CORE
Per RN, arrived to PACU accompanied by anesthesia and was doing well, no c/o pain, no narcotics given.  When rounding noted patient 02 sat 64% with appropriate waveform on luz, resps shallow.  Hypertensive.  Immediately placed ambu and manually bagged with increase sats 88%.  Anesthesia called STAT and came to bedside to takeover airway management.  Team paged and at bedside.  ABG sent.  GIven unclear mental status it was decided to proceed with intubation per anesthesia team.  Succ given and Intubated with glidescope by anesthesia without incident.  Remains intubated, SICU team at bedside as dispo now ICU.

## 2022-01-28 LAB
ANION GAP SERPL CALC-SCNC: 9 MMOL/L — SIGNIFICANT CHANGE UP (ref 5–17)
BASE EXCESS BLDA CALC-SCNC: 1.1 MMOL/L — SIGNIFICANT CHANGE UP (ref -2–3)
BUN SERPL-MCNC: 18 MG/DL — SIGNIFICANT CHANGE UP (ref 7–23)
CALCIUM SERPL-MCNC: 8.3 MG/DL — LOW (ref 8.4–10.5)
CHLORIDE SERPL-SCNC: 106 MMOL/L — SIGNIFICANT CHANGE UP (ref 96–108)
CHOLEST FLD-MCNC: 52 MG/DL — SIGNIFICANT CHANGE UP
CO2 BLDA-SCNC: 26 MMOL/L — HIGH (ref 19–24)
CO2 SERPL-SCNC: 24 MMOL/L — SIGNIFICANT CHANGE UP (ref 22–31)
CREAT SERPL-MCNC: 1.1 MG/DL — SIGNIFICANT CHANGE UP (ref 0.5–1.3)
GAS PNL BLDA: SIGNIFICANT CHANGE UP
GLUCOSE SERPL-MCNC: 126 MG/DL — HIGH (ref 70–99)
HCO3 BLDA-SCNC: 25 MMOL/L — SIGNIFICANT CHANGE UP (ref 21–28)
HCT VFR BLD CALC: 26.4 % — LOW (ref 34.5–45)
HCT VFR BLD CALC: 26.6 % — LOW (ref 34.5–45)
HGB BLD-MCNC: 8.6 G/DL — LOW (ref 11.5–15.5)
HGB BLD-MCNC: 8.6 G/DL — LOW (ref 11.5–15.5)
MAGNESIUM SERPL-MCNC: 2.6 MG/DL — SIGNIFICANT CHANGE UP (ref 1.6–2.6)
MCHC RBC-ENTMCNC: 25.1 PG — LOW (ref 27–34)
MCHC RBC-ENTMCNC: 25.4 PG — LOW (ref 27–34)
MCHC RBC-ENTMCNC: 32.3 GM/DL — SIGNIFICANT CHANGE UP (ref 32–36)
MCHC RBC-ENTMCNC: 32.6 GM/DL — SIGNIFICANT CHANGE UP (ref 32–36)
MCV RBC AUTO: 77.6 FL — LOW (ref 80–100)
MCV RBC AUTO: 77.9 FL — LOW (ref 80–100)
NON-GYNECOLOGICAL CYTOLOGY STUDY: SIGNIFICANT CHANGE UP
NRBC # BLD: 0 /100 WBCS — SIGNIFICANT CHANGE UP (ref 0–0)
NRBC # BLD: 0 /100 WBCS — SIGNIFICANT CHANGE UP (ref 0–0)
PCO2 BLDA: 37 MMHG — HIGH (ref 32–35)
PH BLDA: 7.44 — SIGNIFICANT CHANGE UP (ref 7.35–7.45)
PHOSPHATE SERPL-MCNC: 4.8 MG/DL — HIGH (ref 2.5–4.5)
PLATELET # BLD AUTO: 153 K/UL — SIGNIFICANT CHANGE UP (ref 150–400)
PLATELET # BLD AUTO: 157 K/UL — SIGNIFICANT CHANGE UP (ref 150–400)
PO2 BLDA: 339 MMHG — HIGH (ref 83–108)
POTASSIUM SERPL-MCNC: 4.5 MMOL/L — SIGNIFICANT CHANGE UP (ref 3.5–5.3)
POTASSIUM SERPL-SCNC: 4.5 MMOL/L — SIGNIFICANT CHANGE UP (ref 3.5–5.3)
RBC # BLD: 3.39 M/UL — LOW (ref 3.8–5.2)
RBC # BLD: 3.43 M/UL — LOW (ref 3.8–5.2)
RBC # FLD: 15 % — HIGH (ref 10.3–14.5)
RBC # FLD: 15.2 % — HIGH (ref 10.3–14.5)
SAO2 % BLDA: 98.5 % — HIGH (ref 94–98)
SODIUM SERPL-SCNC: 139 MMOL/L — SIGNIFICANT CHANGE UP (ref 135–145)
SPECIMEN SOURCE FLD: SIGNIFICANT CHANGE UP
WBC # BLD: 8.56 K/UL — SIGNIFICANT CHANGE UP (ref 3.8–10.5)
WBC # BLD: 9.09 K/UL — SIGNIFICANT CHANGE UP (ref 3.8–10.5)
WBC # FLD AUTO: 8.56 K/UL — SIGNIFICANT CHANGE UP (ref 3.8–10.5)
WBC # FLD AUTO: 9.09 K/UL — SIGNIFICANT CHANGE UP (ref 3.8–10.5)

## 2022-01-28 PROCEDURE — 99233 SBSQ HOSP IP/OBS HIGH 50: CPT | Mod: GC

## 2022-01-28 PROCEDURE — 71045 X-RAY EXAM CHEST 1 VIEW: CPT | Mod: 26

## 2022-01-28 RX ORDER — DIPHENHYDRAMINE HCL 50 MG
25 CAPSULE ORAL ONCE
Refills: 0 | Status: COMPLETED | OUTPATIENT
Start: 2022-01-28 | End: 2022-01-28

## 2022-01-28 RX ORDER — SODIUM CHLORIDE 9 MG/ML
500 INJECTION, SOLUTION INTRAVENOUS ONCE
Refills: 0 | Status: COMPLETED | OUTPATIENT
Start: 2022-01-28 | End: 2022-01-28

## 2022-01-28 RX ORDER — ACETAMINOPHEN 500 MG
650 TABLET ORAL EVERY 6 HOURS
Refills: 0 | Status: DISCONTINUED | OUTPATIENT
Start: 2022-01-28 | End: 2022-01-28

## 2022-01-28 RX ORDER — ACETAMINOPHEN 500 MG
1000 TABLET ORAL ONCE
Refills: 0 | Status: COMPLETED | OUTPATIENT
Start: 2022-01-28 | End: 2022-01-28

## 2022-01-28 RX ORDER — ENOXAPARIN SODIUM 100 MG/ML
30 INJECTION SUBCUTANEOUS EVERY 24 HOURS
Refills: 0 | Status: DISCONTINUED | OUTPATIENT
Start: 2022-01-28 | End: 2022-02-02

## 2022-01-28 RX ORDER — LANOLIN ALCOHOL/MO/W.PET/CERES
5 CREAM (GRAM) TOPICAL AT BEDTIME
Refills: 0 | Status: DISCONTINUED | OUTPATIENT
Start: 2022-01-28 | End: 2022-02-02

## 2022-01-28 RX ADMIN — ENOXAPARIN SODIUM 30 MILLIGRAM(S): 100 INJECTION SUBCUTANEOUS at 23:21

## 2022-01-28 RX ADMIN — SODIUM CHLORIDE 500 MILLILITER(S): 9 INJECTION, SOLUTION INTRAVENOUS at 04:24

## 2022-01-28 RX ADMIN — CHLORHEXIDINE GLUCONATE 1 APPLICATION(S): 213 SOLUTION TOPICAL at 06:00

## 2022-01-28 RX ADMIN — Medication 400 MILLIGRAM(S): at 23:25

## 2022-01-28 RX ADMIN — CHLORHEXIDINE GLUCONATE 15 MILLILITER(S): 213 SOLUTION TOPICAL at 06:00

## 2022-01-28 RX ADMIN — PROPOFOL 3.05 MICROGRAM(S)/KG/MIN: 10 INJECTION, EMULSION INTRAVENOUS at 06:00

## 2022-01-28 RX ADMIN — Medication 1000 MILLIGRAM(S): at 23:45

## 2022-01-28 RX ADMIN — Medication 1000 MILLIGRAM(S): at 06:30

## 2022-01-28 RX ADMIN — Medication 25 MILLIGRAM(S): at 23:25

## 2022-01-28 RX ADMIN — SODIUM CHLORIDE 80 MILLILITER(S): 9 INJECTION, SOLUTION INTRAVENOUS at 04:24

## 2022-01-28 RX ADMIN — Medication 400 MILLIGRAM(S): at 06:12

## 2022-01-28 NOTE — PROGRESS NOTE ADULT - ASSESSMENT
INTERVAL/OVERNIGHT EVENTS:    SUBJECTIVE:     POD #  SICU Day #    Neurologic Medications  fentaNYL   Infusion 0.5 MICROgram(s)/kG/Hr IV Continuous <Continuous>  ondansetron Injectable 4 milliGRAM(s) IV Push every 4 hours PRN Nausea and/or Vomiting  propofol Infusion 10 MICROgram(s)/kG/Min IV Continuous <Continuous>    Respiratory Medications    Cardiovascular Medications    Gastrointestinal Medications  lactated ringers. 1000 milliLiter(s) IV Continuous <Continuous>    Genitourinary Medications    Hematologic/Oncologic Medications  enoxaparin Injectable 40 milliGRAM(s) SubCutaneous every 24 hours    Antimicrobial/Immunologic Medications    Endocrine/Metabolic Medications    Topical/Other Medications  benzocaine 15 mG/menthol 3.6 mG Lozenge 1 Lozenge Oral every 6 hours PRN Sore Throat  chlorhexidine 0.12% Liquid 15 milliLiter(s) Oral Mucosa every 12 hours  chlorhexidine 2% Cloths 1 Application(s) Topical <User Schedule>      MEDICATIONS  (PRN):  benzocaine 15 mG/menthol 3.6 mG Lozenge 1 Lozenge Oral every 6 hours PRN Sore Throat  ondansetron Injectable 4 milliGRAM(s) IV Push every 4 hours PRN Nausea and/or Vomiting      I&O's Detail    27 Jan 2022 07:01  -  28 Jan 2022 07:00  --------------------------------------------------------  IN:    FentaNYL: 35.5 mL    Lactated Ringers: 1200 mL    Lactated Ringers Bolus: 500 mL    Propofol: 60.4 mL  Total IN: 1795.9 mL    OUT:    Bulb (mL): 380 mL    Chest Tube (mL): 185 mL    Nasogastric/Oral tube (mL): 100 mL    Voided (mL): 1025 mL  Total OUT: 1690 mL    Total NET: 105.9 mL      28 Jan 2022 07:01  -  28 Jan 2022 08:33  --------------------------------------------------------  IN:    FentaNYL: 2.5 mL    Lactated Ringers: 80 mL  Total IN: 82.5 mL    OUT:    Propofol: 0 mL  Total OUT: 0 mL    Total NET: 82.5 mL          Vital Signs Last 24 Hrs  T(C): 36.9 (28 Jan 2022 06:35), Max: 36.9 (28 Jan 2022 06:35)  T(F): 98.5 (28 Jan 2022 06:35), Max: 98.5 (28 Jan 2022 06:35)  HR: 85 (28 Jan 2022 05:03) (83 - 110)  BP: 109/54 (28 Jan 2022 05:00) (97/48 - 249/113)  BP(mean): 78 (28 Jan 2022 05:00) (67 - 162)  RR: 15 (28 Jan 2022 05:03) (12 - 33)  SpO2: 100% (28 Jan 2022 05:03) (68% - 100%)    GENERAL: NAD, resting comfortably in bed  HEENT: NCAT, MMM  C/V: Normal rate, normal peripheral perfusion  PULM: Nonlabored breathing, no respiratory distress, Mode: AC/ CMV (Assist Control/ Continuous Mandatory Ventilation), RR (machine): 12, TV (machine): 380, FiO2: 40, PEEP: 5, ITime: 1, MAP: 8.1, PIP: 20  ABD: Soft, ND, NT, no rebound tenderness, no guarding  EXTREM: WWP, no edema, SCDs in place  NEURO: No focal deficits    LABS:                        8.6    9.09  )-----------( 153      ( 28 Jan 2022 05:53 )             26.4     01-28    139  |  106  |  18  ----------------------------<  126<H>  4.5   |  24  |  1.10    Ca    8.3<L>      28 Jan 2022 05:53  Phos  4.8     01-28  Mg     2.6     01-28    TPro  7.1  /  Alb  3.7  /  TBili  1.0  /  DBili  x   /  AST  109<H>  /  ALT  79<H>  /  AlkPhos  72  01-27    PT/INR - ( 27 Jan 2022 15:00 )   PT: 14.1 sec;   INR: 1.18          PTT - ( 27 Jan 2022 15:00 )  PTT:32.7 sec      RADIOLOGY & ADDITIONAL STUDIES:      Culture - Acid Fast - Body Fluid w/Smear (collected 01-27-22 @ 00:40)  Source: .Body Fluid left pleural effusion    Culture - Fungal, Body Fluid (collected 01-27-22 @ 00:40)  Source: .Body Fluid left pleural effusion  Preliminary Report (01-27-22 @ 12:30):    Testing in progress    Culture - Body Fluid with Gram Stain (collected 01-26-22 @ 19:10)  Source: Pleural Fl left pleural effusion  Gram Stain (01-26-22 @ 19:49):    No organisms seen    No WBC's seen.  Preliminary Report (01-27-22 @ 08:36):    No growth to date     85 yo female with hx of lyme diseases, lupus and gastric adenocarcinoma presenting with a pleural effusion prior to surgery. Admitted to [facility] for observation and pleural effusion s/p pigtail drainage preop and s/p lap distal gastrectomy, D1+2 LAD, B2 recon (1/27). Difficult intubation, desaturations in PACU, reintubated. Patient extubated this am and has been breathing comfortably on her own since.     NEURO: Tylenol IV, Propofol gtt, Fentanyl gtt. Holding Gabapentin, Duloxetine.  HENT: NTD  CV: Hemodynamically stable. Holding ASA81, Statin.   PULM: Reintubated in PACU on 1/27, now extubated on 1/28. Left Chest tube for recurrent pleural effusions (1/26-)   GI: NPO/LR 80, NGT to [place]  : Ap  HEME: LVX 40  ENDO: ISS, holding Plaquenil  ID: none  PT: not ordered  DISPO: SICU

## 2022-01-28 NOTE — PROVIDER CONTACT NOTE (CHANGE IN STATUS NOTIFICATION) - SITUATION
left sided Chest tube clamped by MD Smith at bedside at 11 am.
urine output for 8-9am is 20cc. urine is yellow & clear.
pt observed with an O2 sat of 64% and not responding to simple commands. Anesthesia NP at bedside. Ambu bag applied with 100% O2.  Anesthesia team paged and arrived at bedside

## 2022-01-28 NOTE — DIETITIAN INITIAL EVALUATION ADULT. - PERTINENT LABORATORY DATA
Sodium, Serum: 139 mmol/L  Potassium, Serum: 4.5 mmol/L  Chloride, Serum: 106 mmol/L  BUN, Serum: 18 mg/dL  Creatinine, Serum: 1.10 mg/dL  Glucose, Serum: 126 mg/dL (Elevated)  Calcium, Serum: 8.3 mg/dL (Low)  Magnesium, Serum: 2.6 mg/dL  Phosphorus, Serum: 4.8 mg/dL (Elevated)

## 2022-01-28 NOTE — PROGRESS NOTE ADULT - ASSESSMENT
85 yo female with hx of lyme diseases, lupus and gastric adenocarcinoma presenting with a pleural effusion s/p drainage 1/27/21 and Lap distal gastrectomy, D1+2 LAD, B2 recon 1/28/21. Required reintubation in PACU, tx to SICU intubated     Wean to extubate per ICU  NGT may be removed after extubation   Continue NPO  F/u pulmonology recs    Appreciate SICU care      Plan discussed with attending and chief resident.   ____________________________________________________  [facility] - Resident   Surgery

## 2022-01-28 NOTE — DIETITIAN INITIAL EVALUATION ADULT. - OTHER CALCULATIONS
Based on Standards of Care pt >100% IBW thus ideal body weight used for all calculations. Needs adjusted for advanced age, CA and post op healing.

## 2022-01-28 NOTE — PROVIDER CONTACT NOTE (CHANGE IN STATUS NOTIFICATION) - BACKGROUND
IVF was seen turned off at 8am, IVF order at 80cc/hr. unsure if IVF was running prior to 8am. bolus IVF given at 4am.
87 y/o female s/p distal gastrectomy .History of TIA, lyme disease, gastric ulcer, anemia, pleural effusion, pulmonary valve disease, stroke

## 2022-01-28 NOTE — DIETITIAN INITIAL EVALUATION ADULT. - OTHER INFO
87 yo female with hx of lyme diseases, lupus and gastric adenocarcinoma presenting with a pleural effusion s/p drainage 1/27/21 and Lap distal gastrectomy, D1+2 LAD, B2 recon 1/28/21. Required reintubation in PACU, tx to SICU intubated. NGT to suction d/aldair today.    Pt seen at bedside for initial assessment- intubated on CPAP mode- MAP 70 (off pressor support). Last documented bowel movement 1/26. Pt able to nod head yes or no to questions being asked. Confirms NKFA. Unable to obtain diet recall PTA. Reports usual body weight 112 pounds (consistent with dosing weight 112 pounds). Reports weight has been stable. Per EMR, 12/14/21; 112 pounds; stable x 1 month. No edema documented at this time. No pressure ulcers documented at this time. 4x lap site incisions per chart. Ramo score=17. Labs reviewed 1/28; pt hyperphosphatemic; consider phos binder. Observed pt with no overt signs of muscle or fat wasting. Based on ASPEN guidelines, pt does not meet criteria for malnutrition at this time, will continue to monitor. RD provided pt w/ diet education on likelihood of diet advancement upon extubation; pt amenable to education. Discussed likelihood of advancement from NPO--> CLD--> Regular diet. Made aware RD remains available. RD to follow up per protocol. See nutrition recommendations below.

## 2022-01-28 NOTE — PROGRESS NOTE ADULT - SUBJECTIVE AND OBJECTIVE BOX
HX: Lap distal gastrectomy, D1+2 LAD, B2 recon 1/28/21     24 HOUR EVENT:   ON: ABG 7.45/26/186/18/98%. RR dec to 10 from 12. 500cc bolus for UOP 15cc/hr @4am.   1/27: Went to OR, In PACU hypoxic and becoming altered. ABG 7.2/62/28/98--reintubated. [facility] advanced gently per chief resident orders, no resistance NG tube in place.     SUBJECTIVE: Patient seen and examined bedside by chief resident. Patient awake and alert, denies abdominal pain. Denies N/V, intubated.     enoxaparin Injectable 40 milliGRAM(s) SubCutaneous every 24 hours    MEDICATIONS  (PRN):  benzocaine 15 mG/menthol 3.6 mG Lozenge 1 Lozenge Oral every 6 hours PRN Sore Throat  ondansetron Injectable 4 milliGRAM(s) IV Push every 4 hours PRN Nausea and/or Vomiting      I&O's Detail    27 Jan 2022 07:01  -  28 Jan 2022 07:00  --------------------------------------------------------  IN:    FentaNYL: 35.5 mL    Lactated Ringers: 1200 mL    Lactated Ringers Bolus: 500 mL    Propofol: 60.4 mL  Total IN: 1795.9 mL    OUT:    Bulb (mL): 380 mL    Chest Tube (mL): 185 mL    Nasogastric/Oral tube (mL): 100 mL    Voided (mL): 1025 mL  Total OUT: 1690 mL    Total NET: 105.9 mL      28 Jan 2022 07:01  -  28 Jan 2022 08:50  --------------------------------------------------------  IN:    FentaNYL: 2.5 mL    Lactated Ringers: 80 mL  Total IN: 82.5 mL    OUT:    Propofol: 0 mL    Voided (mL): 20 mL  Total OUT: 20 mL    Total NET: 62.5 mL          Vital Signs Last 24 Hrs  T(C): 36.9 (28 Jan 2022 06:35), Max: 36.9 (28 Jan 2022 06:35)  T(F): 98.5 (28 Jan 2022 06:35), Max: 98.5 (28 Jan 2022 06:35)  HR: 86 (28 Jan 2022 08:00) (83 - 110)  BP: 115/56 (28 Jan 2022 08:00) (97/48 - 249/113)  BP(mean): 80 (28 Jan 2022 08:00) (67 - 162)  RR: 19 (28 Jan 2022 08:00) (12 - 33)  SpO2: 100% (28 Jan 2022 08:00) (68% - 100%)    PHYSICAL EXAM:   Gen: NAD, resting comfortably in bed   HEENT: NGT in place   CV: NSR   Pulm: no respiratory distress, intubated  Abd: soft, ND, NTTP, no rebound or guarding, NATALYA in place with SS output, pigtail chest tube in place  Ext: WWP, no edema   Neuro: motor/sensory grossly intact     LABS:                        8.6    9.09  )-----------( 153      ( 28 Jan 2022 05:53 )             26.4     01-28    139  |  106  |  18  ----------------------------<  126<H>  4.5   |  24  |  1.10    Ca    8.3<L>      28 Jan 2022 05:53  Phos  4.8     01-28  Mg     2.6     01-28    TPro  7.1  /  Alb  3.7  /  TBili  1.0  /  DBili  x   /  AST  109<H>  /  ALT  79<H>  /  AlkPhos  72  01-27    LIVER FUNCTIONS - ( 27 Jan 2022 15:00 )  Alb: 3.7 g/dL / Pro: 7.1 g/dL / ALK PHOS: 72 U/L / ALT: 79 U/L / AST: 109 U/L / GGT: x           PT/INR - ( 27 Jan 2022 15:00 )   PT: 14.1 sec;   INR: 1.18          PTT - ( 27 Jan 2022 15:00 )  PTT:32.7 sec  CAPILLARY BLOOD GLUCOSE        Culture - Acid Fast - Body Fluid w/Smear (collected 27 Jan 2022 00:40)  Source: .Body Fluid left pleural effusion    Culture - Fungal, Body Fluid (collected 27 Jan 2022 00:40)  Source: .Body Fluid left pleural effusion  Preliminary Report (27 Jan 2022 12:30):    Testing in progress    Culture - Body Fluid with Gram Stain (collected 26 Jan 2022 19:10)  Source: Pleural Fl left pleural effusion  Gram Stain (26 Jan 2022 19:49):    No organisms seen    No WBC's seen.  Preliminary Report (27 Jan 2022 08:36):    No growth to date

## 2022-01-28 NOTE — PROVIDER CONTACT NOTE (CHANGE IN STATUS NOTIFICATION) - ACTION/TREATMENT ORDERED:
none.
none.
patient re-intubated by Anesthesia at 15:02. ICU team at bedside. Plan is for transfer to ICU

## 2022-01-28 NOTE — DIETITIAN INITIAL EVALUATION ADULT. - REASON INDICATOR FOR ASSESSMENT
Tylenol dose = 120 mg = 3.75 ml; ibuprofen dose = 75 mg = 3.75 ml of children's strength or 1.87 ml of infant strength (must be 6 mo of age for ibuprofen)    To help your child's ear infection and pain:  · Sitting upright lessens the throbbing  · A heating Seen for length of stay initial assessment

## 2022-01-28 NOTE — PROGRESS NOTE ADULT - SUBJECTIVE AND OBJECTIVE BOX
INTERVAL/OVERNIGHT EVENTS:  ON: ABG 7.45/26/186/18/98%. RR dec to 10 from 12. 500cc bolus for UOP 15cc/hr @4am.   1/27: Went to OR, In PACU hypoxic and becoming altered. ABG 7.2/62/28/98--reintubated. CXR shows NGT at [facility], advanced gently per chief resident orders, no resistance NG tube in place.     SUBJECTIVE: Resting comfortably in bed post extubation. Pain well controlled at this time. No complaints.       Neurologic Medications  fentaNYL   Infusion 0.5 MICROgram(s)/kG/Hr IV Continuous <Continuous>  ondansetron Injectable 4 milliGRAM(s) IV Push every 4 hours PRN Nausea and/or Vomiting  propofol Infusion 10 MICROgram(s)/kG/Min IV Continuous <Continuous>    Respiratory Medications    Cardiovascular Medications    Gastrointestinal Medications  lactated ringers. 1000 milliLiter(s) IV Continuous <Continuous>    Genitourinary Medications    Hematologic/Oncologic Medications  enoxaparin Injectable 40 milliGRAM(s) SubCutaneous every 24 hours    Antimicrobial/Immunologic Medications    Endocrine/Metabolic Medications    Topical/Other Medications  benzocaine 15 mG/menthol 3.6 mG Lozenge 1 Lozenge Oral every 6 hours PRN Sore Throat  chlorhexidine 0.12% Liquid 15 milliLiter(s) Oral Mucosa every 12 hours  chlorhexidine 2% Cloths 1 Application(s) Topical <User Schedule>      MEDICATIONS  (PRN):  benzocaine 15 mG/menthol 3.6 mG Lozenge 1 Lozenge Oral every 6 hours PRN Sore Throat  ondansetron Injectable 4 milliGRAM(s) IV Push every 4 hours PRN Nausea and/or Vomiting      I&O's Detail    27 Jan 2022 07:01  -  28 Jan 2022 07:00  --------------------------------------------------------  IN:    FentaNYL: 35.5 mL    Lactated Ringers: 1200 mL    Lactated Ringers Bolus: 500 mL    Propofol: 60.4 mL  Total IN: 1795.9 mL    OUT:    Bulb (mL): 380 mL    Chest Tube (mL): 185 mL    Nasogastric/Oral tube (mL): 100 mL    Voided (mL): 1025 mL  Total OUT: 1690 mL    Total NET: 105.9 mL      28 Jan 2022 07:01  -  28 Jan 2022 08:33  --------------------------------------------------------  IN:    FentaNYL: 2.5 mL    Lactated Ringers: 80 mL  Total IN: 82.5 mL    OUT:    Propofol: 0 mL  Total OUT: 0 mL    Total NET: 82.5 mL          Vital Signs Last 24 Hrs  T(C): 36.9 (28 Jan 2022 06:35), Max: 36.9 (28 Jan 2022 06:35)  T(F): 98.5 (28 Jan 2022 06:35), Max: 98.5 (28 Jan 2022 06:35)  HR: 85 (28 Jan 2022 05:03) (83 - 110)  BP: 109/54 (28 Jan 2022 05:00) (97/48 - 249/113)  BP(mean): 78 (28 Jan 2022 05:00) (67 - 162)  RR: 15 (28 Jan 2022 05:03) (12 - 33)  SpO2: 100% (28 Jan 2022 05:03) (68% - 100%)    GENERAL: NAD, resting comfortably in bed  HEENT: NCAT, MMM  C/V: Normal rate, normal peripheral perfusion  PULM: Nonlabored breathing, no respiratory distress, chest tube clamped on rounds per consultation with pulm   ABD: Soft, ND, NT, no rebound tenderness, no guarding, incisions CDI   EXTREM: WWP, no edema, SCDs in place  NEURO: No focal deficits    LABS:                        8.6    9.09  )-----------( 153      ( 28 Jan 2022 05:53 )             26.4     01-28    139  |  106  |  18  ----------------------------<  126<H>  4.5   |  24  |  1.10    Ca    8.3<L>      28 Jan 2022 05:53  Phos  4.8     01-28  Mg     2.6     01-28    TPro  7.1  /  Alb  3.7  /  TBili  1.0  /  DBili  x   /  AST  109<H>  /  ALT  79<H>  /  AlkPhos  72  01-27    PT/INR - ( 27 Jan 2022 15:00 )   PT: 14.1 sec;   INR: 1.18          PTT - ( 27 Jan 2022 15:00 )  PTT:32.7 sec      RADIOLOGY & ADDITIONAL STUDIES:      Culture - Acid Fast - Body Fluid w/Smear (collected 01-27-22 @ 00:40)  Source: .Body Fluid left pleural effusion    Culture - Fungal, Body Fluid (collected 01-27-22 @ 00:40)  Source: .Body Fluid left pleural effusion  Preliminary Report (01-27-22 @ 12:30):    Testing in progress    Culture - Body Fluid with Gram Stain (collected 01-26-22 @ 19:10)  Source: Pleural Fl left pleural effusion  Gram Stain (01-26-22 @ 19:49):    No organisms seen    No WBC's seen.  Preliminary Report (01-27-22 @ 08:36):    No growth to date

## 2022-01-29 LAB
ANION GAP SERPL CALC-SCNC: 13 MMOL/L — SIGNIFICANT CHANGE UP (ref 5–17)
BASOPHILS # BLD AUTO: 0.01 K/UL — SIGNIFICANT CHANGE UP (ref 0–0.2)
BASOPHILS NFR BLD AUTO: 0.1 % — SIGNIFICANT CHANGE UP (ref 0–2)
BUN SERPL-MCNC: 17 MG/DL — SIGNIFICANT CHANGE UP (ref 7–23)
CALCIUM SERPL-MCNC: 7.7 MG/DL — LOW (ref 8.4–10.5)
CHLORIDE SERPL-SCNC: 108 MMOL/L — SIGNIFICANT CHANGE UP (ref 96–108)
CO2 SERPL-SCNC: 20 MMOL/L — LOW (ref 22–31)
CREAT SERPL-MCNC: 1.02 MG/DL — SIGNIFICANT CHANGE UP (ref 0.5–1.3)
EOSINOPHIL # BLD AUTO: 0.14 K/UL — SIGNIFICANT CHANGE UP (ref 0–0.5)
EOSINOPHIL NFR BLD AUTO: 1.7 % — SIGNIFICANT CHANGE UP (ref 0–6)
GLUCOSE BLDC GLUCOMTR-MCNC: 106 MG/DL — HIGH (ref 70–99)
GLUCOSE BLDC GLUCOMTR-MCNC: 137 MG/DL — HIGH (ref 70–99)
GLUCOSE BLDC GLUCOMTR-MCNC: 137 MG/DL — HIGH (ref 70–99)
GLUCOSE BLDC GLUCOMTR-MCNC: 92 MG/DL — SIGNIFICANT CHANGE UP (ref 70–99)
GLUCOSE SERPL-MCNC: 41 MG/DL — CRITICAL LOW (ref 70–99)
HCT VFR BLD CALC: 28.2 % — LOW (ref 34.5–45)
HGB BLD-MCNC: 8.9 G/DL — LOW (ref 11.5–15.5)
IMM GRANULOCYTES NFR BLD AUTO: 0.2 % — SIGNIFICANT CHANGE UP (ref 0–1.5)
LYMPHOCYTES # BLD AUTO: 1.33 K/UL — SIGNIFICANT CHANGE UP (ref 1–3.3)
LYMPHOCYTES # BLD AUTO: 15.9 % — SIGNIFICANT CHANGE UP (ref 13–44)
MAGNESIUM SERPL-MCNC: 1.9 MG/DL — SIGNIFICANT CHANGE UP (ref 1.6–2.6)
MCHC RBC-ENTMCNC: 25.2 PG — LOW (ref 27–34)
MCHC RBC-ENTMCNC: 31.6 GM/DL — LOW (ref 32–36)
MCV RBC AUTO: 79.9 FL — LOW (ref 80–100)
MONOCYTES # BLD AUTO: 1.04 K/UL — HIGH (ref 0–0.9)
MONOCYTES NFR BLD AUTO: 12.4 % — SIGNIFICANT CHANGE UP (ref 2–14)
NEUTROPHILS # BLD AUTO: 5.83 K/UL — SIGNIFICANT CHANGE UP (ref 1.8–7.4)
NEUTROPHILS NFR BLD AUTO: 69.7 % — SIGNIFICANT CHANGE UP (ref 43–77)
NRBC # BLD: 0 /100 WBCS — SIGNIFICANT CHANGE UP (ref 0–0)
PHOSPHATE SERPL-MCNC: 4.3 MG/DL — SIGNIFICANT CHANGE UP (ref 2.5–4.5)
PLATELET # BLD AUTO: 182 K/UL — SIGNIFICANT CHANGE UP (ref 150–400)
POTASSIUM SERPL-MCNC: 4 MMOL/L — SIGNIFICANT CHANGE UP (ref 3.5–5.3)
POTASSIUM SERPL-SCNC: 4 MMOL/L — SIGNIFICANT CHANGE UP (ref 3.5–5.3)
RBC # BLD: 3.53 M/UL — LOW (ref 3.8–5.2)
RBC # FLD: 15.4 % — HIGH (ref 10.3–14.5)
SARS-COV-2 RNA SPEC QL NAA+PROBE: SIGNIFICANT CHANGE UP
SODIUM SERPL-SCNC: 141 MMOL/L — SIGNIFICANT CHANGE UP (ref 135–145)
WBC # BLD: 8.37 K/UL — SIGNIFICANT CHANGE UP (ref 3.8–10.5)
WBC # FLD AUTO: 8.37 K/UL — SIGNIFICANT CHANGE UP (ref 3.8–10.5)

## 2022-01-29 PROCEDURE — 71045 X-RAY EXAM CHEST 1 VIEW: CPT | Mod: 26

## 2022-01-29 PROCEDURE — 99233 SBSQ HOSP IP/OBS HIGH 50: CPT | Mod: GC

## 2022-01-29 RX ORDER — SODIUM CHLORIDE 9 MG/ML
1000 INJECTION, SOLUTION INTRAVENOUS
Refills: 0 | Status: DISCONTINUED | OUTPATIENT
Start: 2022-01-29 | End: 2022-01-29

## 2022-01-29 RX ORDER — CALCIUM GLUCONATE 100 MG/ML
1 VIAL (ML) INTRAVENOUS ONCE
Refills: 0 | Status: COMPLETED | OUTPATIENT
Start: 2022-01-29 | End: 2022-01-29

## 2022-01-29 RX ORDER — ACETAMINOPHEN 500 MG
1000 TABLET ORAL ONCE
Refills: 0 | Status: COMPLETED | OUTPATIENT
Start: 2022-01-29 | End: 2022-01-29

## 2022-01-29 RX ORDER — SODIUM CHLORIDE 9 MG/ML
1000 INJECTION, SOLUTION INTRAVENOUS
Refills: 0 | Status: DISCONTINUED | OUTPATIENT
Start: 2022-01-29 | End: 2022-01-31

## 2022-01-29 RX ORDER — DEXTROSE 10 % IN WATER 10 %
250 INTRAVENOUS SOLUTION INTRAVENOUS
Refills: 0 | Status: DISCONTINUED | OUTPATIENT
Start: 2022-01-29 | End: 2022-01-30

## 2022-01-29 RX ORDER — LABETALOL HCL 100 MG
10 TABLET ORAL ONCE
Refills: 0 | Status: COMPLETED | OUTPATIENT
Start: 2022-01-29 | End: 2022-01-29

## 2022-01-29 RX ORDER — INSULIN LISPRO 100/ML
VIAL (ML) SUBCUTANEOUS EVERY 6 HOURS
Refills: 0 | Status: DISCONTINUED | OUTPATIENT
Start: 2022-01-29 | End: 2022-02-02

## 2022-01-29 RX ORDER — DEXTROSE 10 % IN WATER 10 %
1000 INTRAVENOUS SOLUTION INTRAVENOUS
Refills: 0 | Status: DISCONTINUED | OUTPATIENT
Start: 2022-01-29 | End: 2022-01-29

## 2022-01-29 RX ORDER — ACETAMINOPHEN 500 MG
650 TABLET ORAL EVERY 6 HOURS
Refills: 0 | Status: DISCONTINUED | OUTPATIENT
Start: 2022-01-29 | End: 2022-02-02

## 2022-01-29 RX ORDER — MAGNESIUM SULFATE 500 MG/ML
1 VIAL (ML) INJECTION ONCE
Refills: 0 | Status: COMPLETED | OUTPATIENT
Start: 2022-01-29 | End: 2022-01-29

## 2022-01-29 RX ADMIN — Medication 100 GRAM(S): at 06:57

## 2022-01-29 RX ADMIN — ENOXAPARIN SODIUM 30 MILLIGRAM(S): 100 INJECTION SUBCUTANEOUS at 21:01

## 2022-01-29 RX ADMIN — Medication 250 MILLILITER(S): at 06:40

## 2022-01-29 RX ADMIN — Medication 1000 MILLIGRAM(S): at 14:19

## 2022-01-29 RX ADMIN — SODIUM CHLORIDE 80 MILLILITER(S): 9 INJECTION, SOLUTION INTRAVENOUS at 02:41

## 2022-01-29 RX ADMIN — Medication 400 MILLIGRAM(S): at 12:11

## 2022-01-29 RX ADMIN — Medication 5 MILLIGRAM(S): at 21:01

## 2022-01-29 RX ADMIN — CHLORHEXIDINE GLUCONATE 1 APPLICATION(S): 213 SOLUTION TOPICAL at 06:36

## 2022-01-29 RX ADMIN — SODIUM CHLORIDE 80 MILLILITER(S): 9 INJECTION, SOLUTION INTRAVENOUS at 23:34

## 2022-01-29 RX ADMIN — Medication 10 MILLIGRAM(S): at 20:59

## 2022-01-29 RX ADMIN — Medication 10 MILLIGRAM(S): at 15:25

## 2022-01-29 RX ADMIN — Medication 100 GRAM(S): at 10:26

## 2022-01-29 RX ADMIN — SODIUM CHLORIDE 80 MILLILITER(S): 9 INJECTION, SOLUTION INTRAVENOUS at 09:25

## 2022-01-29 NOTE — PROVIDER CONTACT NOTE (CRITICAL VALUE NOTIFICATION) - ACTION/TREATMENT ORDERED:
Dextrose 10% 250 cc bolus. Dextrose 10% + Sodium chloride 0.9% at 80mL/hr continuous. Dextrose 10% 250 cc bolus. Repeat FS 15 min post administration. Dextrose 10% + Sodium chloride 0.9% at 80mL/hr continuous.

## 2022-01-29 NOTE — PROVIDER CONTACT NOTE (CRITICAL VALUE NOTIFICATION) - ASSESSMENT
AOx4. No diaphoresis, tachycardia, palpitations, irritability, or restlessness. Pt c/o of dizziness.

## 2022-01-29 NOTE — PHYSICAL THERAPY INITIAL EVALUATION ADULT - DIAGNOSIS, PT EVAL
5A: Primary Prevention/Risk Reduction for Loss of Balance and Falling  4I: Impaired Joint Mobility, Motor Function, Muscle Performance, and Range of Motion Associated with Bony or Soft Tissue Surgery

## 2022-01-29 NOTE — PHYSICAL THERAPY INITIAL EVALUATION ADULT - PERTINENT HX OF CURRENT PROBLEM, REHAB EVAL
87 yo female with hx of lyme diseases, lupus and gastric adenocarcinoma presenting with a pleural effusion prior to surgery. Admitted to [facility] for observation and pleural effusion s/p pigtail drainage preop and s/p lap distal gastrectomy, D1+2 LAD, B2 recon (1/27). Difficult intubation, desaturations in PACU, reintubated. Extubated 1.28.

## 2022-01-29 NOTE — PHYSICAL THERAPY INITIAL EVALUATION ADULT - ADDITIONAL COMMENTS
Pt resides in elevator building, uses RW, has shower chair and showers when daughter is present. Grab bars in bathroom. Daughter works during the day.

## 2022-01-29 NOTE — PROGRESS NOTE ADULT - ASSESSMENT
A/p: 85 yo female with hx of lyme diseases, lupus and gastric adenocarcinoma presenting with a pleural effusion prior to surgery. Admitted to [facility] for observation and pleural effusion s/p pigtail drainage preop and s/p lap distal gastrectomy, D1+2 LAD, B2 recon (1/27). Difficult intubation, desaturations in PACU, reintubated. Extubated 1.28.    NEURO: Tylenol IV, Holding Gabapentin, Duloxetine. Insomnia: Cont melatonin Cont Zofran   HENT: Sore throat cont cepacol  CV: Hemodynamically stable. Holding ASA81, Statin.   PULM: Reintubated in PACU 1/26, extubated 1/28 to NC. Left Chest tube for recurrent pleural effusions to water seal CXR improved this am - Will f/u pulm recs this am,  GI: NPO D10 NS@80  : Ap  HEME:SQL 40  ENDO: ISS, holding Plaquenil Hypoglycemia - cont D10 NS@80  ID: Periop  PT: Ordered 1/29 OOB to chair  DISPO: SICU

## 2022-01-29 NOTE — PROGRESS NOTE ADULT - ASSESSMENT
Patient is an 85 yo female with hx of gastric adenocarcinoma now s/p laparoscopic distal gastrectomy, D1+2 LAD, B2 recon 1/28/21, transferred to SICU post-operatively due to desaturation in PACU requiring re-intubation. Patient presently extubated on NC. Doing well. Mild pain. +N. +F.     Recommendations:  - OOB to ambulate today  - F/u outputs  - Appreciate pulmonary recommendations  - Switch to D5 w/ 1/2 NS MIVF  - Remaining care at discretion of SICU team   Patient is an 87 yo female with hx of gastric adenocarcinoma now s/p laparoscopic distal gastrectomy, D1+2 LAD, B2 recon 1/28/21, transferred to SICU post-operatively due to desaturation in PACU requiring re-intubation. Patient presently extubated on NC. Doing well. Mild pain. +N. +F.     Recommendations:  - OOB to ambulate today  - Keep NPO  - F/u outputs  - Appreciate pulmonary recommendations  - Switch to D5 w/ 1/2 NS MIVF  - Remaining care at discretion of SICU team

## 2022-01-29 NOTE — PHYSICAL THERAPY INITIAL EVALUATION ADULT - LIVES WITH, PROFILE
Dx: B knee pain         Insurance (Authorized # of Visits): Med necessity           Authorizing Physician: Dr. Merry Winter  Next MD visit: none scheduled  Fall Risk: standard         Precautions: n/a             Subjective: Knees not too bad today, didn't have Tx#: 6/   THERAPEUTIC EX  Nu step L4 10'  Slant board L2 3x30\"  Gastroc stretch 3x30\" B  R knee PROM 6'  SAQ 3x10 B  Clamshells 3x10 B  Shuttle 50 3x10       MANUAL THERAPY  Edema massage 10' R knee       CP 8' R knee              HEP: heel slide, saq, daughter/children

## 2022-01-29 NOTE — PROGRESS NOTE ADULT - SUBJECTIVE AND OBJECTIVE BOX
**********************************INCOMPLETE*************************      Interval Events:  Per pulmunology: CT tube back to water seal tonight. Added tylenol for mild pain and benadryl for insomnia. Glucose on Labs 40's - given d10 bolus and ordered for ISS/ Glucose checks q6. IVF changed to d10 NS.Repeat FS@7 106 AM labs repleted.   Patient seen and examined at bedside.      Allergies    Allergy Status Unknown    Intolerances        Vital Signs Last 24 Hrs  T(C): 36.7 (29 Jan 2022 05:47), Max: 37.2 (28 Jan 2022 14:01)  T(F): 98 (29 Jan 2022 05:47), Max: 98.9 (28 Jan 2022 14:01)  HR: 81 (29 Jan 2022 08:00) (76 - 100)  BP: 127/57 (28 Jan 2022 15:00) (111/53 - 129/59)  BP(mean): 82 (28 Jan 2022 15:00) (77 - 85)  RR: 13 (29 Jan 2022 08:00) (12 - 29)  SpO2: 100% (29 Jan 2022 08:00) (96% - 100%)    01-28 @ 07:01  -  01-29 @ 07:00  --------------------------------------------------------  IN: 2195 mL / OUT: 2215 mL / NET: -20 mL    01-29 @ 07:01  -  01-29 @ 08:27  --------------------------------------------------------  IN: 0 mL / OUT: 50 mL / NET: -50 mL      01-28 @ 07:01  -  01-29 @ 07:00  --------------------------------------------------------  IN: 2195 mL / OUT: 2215 mL / NET: -20 mL    01-29 @ 07:01  - 01-29 @ 08:27  --------------------------------------------------------  IN: 0 mL / OUT: 50 mL / NET: -50 mL        Physical Exam:     GENERAL: NAD, resting comfortably in bed  Neuro: A&ox3 no deficits  HEENT: NCAT, MMM  C/V: Normal rate, normal peripheral perfusion  PULM: Nonlabored breathing, no respiratory distress, chest tube to water seal  ABD: Soft, ND, NT, no rebound tenderness, no guarding, incisions CDI   EXTREM: WWP, no edema, SCDs in place  Vasc: + DP b/l   Skin: no rashes noted  MSK: No joint swelling  Psych: No signs of anxiety or depression      LABS:  ABG - ( 28 Jan 2022 05:54 )  pH, Arterial: 7.44  pH, Blood: x     /  pCO2: 37    /  pO2: 339   / HCO3: 25    / Base Excess: 1.1   /  SaO2: 98.5                CBC Full  -  ( 29 Jan 2022 05:23 )  WBC Count : 8.37 K/uL  RBC Count : 3.53 M/uL  Hemoglobin : 8.9 g/dL  Hematocrit : 28.2 %  Platelet Count - Automated : 182 K/uL  Mean Cell Volume : 79.9 fl  Mean Cell Hemoglobin : 25.2 pg  Mean Cell Hemoglobin Concentration : 31.6 gm/dL  Auto Neutrophil # : 5.83 K/uL  Auto Lymphocyte # : 1.33 K/uL  Auto Monocyte # : 1.04 K/uL  Auto Eosinophil # : 0.14 K/uL  Auto Basophil # : 0.01 K/uL  Auto Neutrophil % : 69.7 %  Auto Lymphocyte % : 15.9 %  Auto Monocyte % : 12.4 %  Auto Eosinophil % : 1.7 %  Auto Basophil % : 0.1 %    01-29    141  |  108  |  17  ----------------------------<  41<LL>  4.0   |  20<L>  |  1.02    Ca    7.7<L>      29 Jan 2022 05:23  Phos  4.3     01-29  Mg     1.9     01-29    TPro  7.1  /  Alb  3.7  /  TBili  1.0  /  DBili  x   /  AST  109<H>  /  ALT  79<H>  /  AlkPhos  72  01-27    PT/INR - ( 27 Jan 2022 15:00 )   PT: 14.1 sec;   INR: 1.18          PTT - ( 27 Jan 2022 15:00 )  PTT:32.7 sec                RADIOLOGY & ADDITIONAL STUDIES (The following images were personally reviewed):          A/p: 85 yo female with hx of lyme diseases, lupus and gastric adenocarcinoma presenting with a pleural effusion prior to surgery. Admitted to [facility] for observation and pleural effusion s/p pigtail drainage preop and s/p lap distal gastrectomy, D1+2 LAD, B2 recon (1/27). Difficult intubation, desaturations in PACU, reintubated.     NEURO: Tylenol IV, Holding Gabapentin, Duloxetine. Insomnia: Cont melatonin Cont Zofran   HENT: Sore throat cont cepacol  CV: Hemodynamically stable. Holding ASA81, Statin.   PULM: Reintubated in PACU 1/26, extubated 1/28 to NC. Left Chest tube for recurrent pleural effusions to water seal CXR improved this am - Will f/u pulm c/s this am,  GI: NPO D10 NS@80  : Ap  HEME:SQL 40  ENDO: ISS, holding Plaquenil Hypoglycemia - cont D10 NS@80  ID: Periop  PT: Ordered 1/29 OOB to chair  DISPO: SICU   Interval Events:  Per pulmonology CT tube back to water seal tonight. Added tylenols for mild pain and benadryl for insomnia. Glucose on Labs 40's - given d10 bolus and ordered for ISS/ Glucose checks q6. IVF changed to d10 NS. Repeat FS@7 106 AM labs repleted.     Patient seen and examined at bedside by SICU team       Allergies    Allergy Status Unknown    Intolerances        Vital Signs Last 24 Hrs  T(C): 36.7 (29 Jan 2022 05:47), Max: 37.2 (28 Jan 2022 14:01)  T(F): 98 (29 Jan 2022 05:47), Max: 98.9 (28 Jan 2022 14:01)  HR: 81 (29 Jan 2022 08:00) (76 - 100)  BP: 127/57 (28 Jan 2022 15:00) (111/53 - 129/59)  BP(mean): 82 (28 Jan 2022 15:00) (77 - 85)  RR: 13 (29 Jan 2022 08:00) (12 - 29)  SpO2: 100% (29 Jan 2022 08:00) (96% - 100%)    01-28 @ 07:01 - 01-29 @ 07:00  --------------------------------------------------------  IN: 2195 mL / OUT: 2215 mL / NET: -20 mL    01-29 @ 07:01 - 01-29 @ 08:27  --------------------------------------------------------  IN: 0 mL / OUT: 50 mL / NET: -50 mL      01-28 @ 07:01 - 01-29 @ 07:00  --------------------------------------------------------  IN: 2195 mL / OUT: 2215 mL / NET: -20 mL    01-29 @ 07:01  -  01-29 @ 08:27  --------------------------------------------------------  IN: 0 mL / OUT: 50 mL / NET: -50 mL        Physical Exam:     GENERAL: NAD, Resting comfortably in bed, awake, opens eyes spontaneously  HEENT: NCAT, MMM, Normal conjunctiva, PERRL  RESP: Nonlabored breathing, No respiratory distress with clear lungs, left chest tube  CARD: Regular rate, S1S2  GI: Soft, ND, NT, No guarding, No rebound tenderness, clean wound  EXTREM: WWP, No edema,   VASC: 2+ pulses  SKIN: No rashes, no lesions  NEURO: moves extremities        LABS:  ABG - ( 28 Jan 2022 05:54 )  pH, Arterial: 7.44  pH, Blood: x     /  pCO2: 37    /  pO2: 339   / HCO3: 25    / Base Excess: 1.1   /  SaO2: 98.5                CBC Full  -  ( 29 Jan 2022 05:23 )  WBC Count : 8.37 K/uL  RBC Count : 3.53 M/uL  Hemoglobin : 8.9 g/dL  Hematocrit : 28.2 %  Platelet Count - Automated : 182 K/uL  Mean Cell Volume : 79.9 fl  Mean Cell Hemoglobin : 25.2 pg  Mean Cell Hemoglobin Concentration : 31.6 gm/dL  Auto Neutrophil # : 5.83 K/uL  Auto Lymphocyte # : 1.33 K/uL  Auto Monocyte # : 1.04 K/uL  Auto Eosinophil # : 0.14 K/uL  Auto Basophil # : 0.01 K/uL  Auto Neutrophil % : 69.7 %  Auto Lymphocyte % : 15.9 %  Auto Monocyte % : 12.4 %  Auto Eosinophil % : 1.7 %  Auto Basophil % : 0.1 %    01-29    141  |  108  |  17  ----------------------------<  41<LL>  4.0   |  20<L>  |  1.02    Ca    7.7<L>      29 Jan 2022 05:23  Phos  4.3     01-29  Mg     1.9     01-29    TPro  7.1  /  Alb  3.7  /  TBili  1.0  /  DBili  x   /  AST  109<H>  /  ALT  79<H>  /  AlkPhos  72  01-27    PT/INR - ( 27 Jan 2022 15:00 )   PT: 14.1 sec;   INR: 1.18          PTT - ( 27 Jan 2022 15:00 )  PTT:32.7 sec                RADIOLOGY & ADDITIONAL STUDIES (The following images were personally reviewed):

## 2022-01-29 NOTE — PROGRESS NOTE ADULT - SUBJECTIVE AND OBJECTIVE BOX
SUBJECTIVE:   Patient seen and evaluated. Patient notes that she passed flatus yesterday. She further endorses mild nausea yesterday. Patient attests that her breathing has improved. She denies any pain at rest although endorses pain with abdominal flexion.     enoxaparin Injectable 30 milliGRAM(s) SubCutaneous every 24 hours      Vital Signs Last 24 Hrs  T(C): 36.7 (29 Jan 2022 05:47), Max: 37.2 (28 Jan 2022 14:01)  T(F): 98 (29 Jan 2022 05:47), Max: 98.9 (28 Jan 2022 14:01)  HR: 96 (29 Jan 2022 07:00) (76 - 100)  BP: 127/57 (28 Jan 2022 15:00) (111/53 - 129/59)  BP(mean): 82 (28 Jan 2022 15:00) (77 - 85)  RR: 18 (29 Jan 2022 07:00) (12 - 29)  SpO2: 100% (29 Jan 2022 07:00) (96% - 100%)  I&O's Detail    28 Jan 2022 07:01  -  29 Jan 2022 07:00  --------------------------------------------------------  IN:    dextrose 10%: 250 mL    FentaNYL: 5 mL    IV PiggyBack: 100 mL    Lactated Ringers: 1840 mL  Total IN: 2195 mL    OUT:    Bulb (mL): 540 mL    Chest Tube (mL): 500 mL    Nasogastric/Oral tube (mL): 0 mL    Propofol: 0 mL    Voided (mL): 1175 mL  Total OUT: 2215 mL    Total NET: -20 mL      29 Jan 2022 07:01  -  29 Jan 2022 08:01  --------------------------------------------------------  IN:  Total IN: 0 mL    OUT:    Voided (mL): 50 mL  Total OUT: 50 mL    Total NET: -50 mL          General: NAD, resting comfortably in bed  C/V: Normal rate.   Pulm: Nonlabored breathing, no respiratory distress. Speaking softly. Mild jaw tremor with vocalization. NC in place on delivering 4L O2. L chest tube in place, no christopher-insertion site erythema or purulence, serosanguinous fluid collected.   Abd: soft, mild abdominal distention. Surgical incision sites c/d/i. Mild TTP christopher-incisional. R NATALYA drain in place with serosanguinous fluid collected.   : De Souza in place collecting straw colored urine.   Extrem: WWP, no edema, SCDs in place      LABS:                        8.9    8.37  )-----------( 182      ( 29 Jan 2022 05:23 )             28.2     01-29    141  |  108  |  17  ----------------------------<  41<LL>  4.0   |  20<L>  |  1.02    Ca    7.7<L>      29 Jan 2022 05:23  Phos  4.3     01-29  Mg     1.9     01-29    TPro  7.1  /  Alb  3.7  /  TBili  1.0  /  DBili  x   /  AST  109<H>  /  ALT  79<H>  /  AlkPhos  72  01-27    PT/INR - ( 27 Jan 2022 15:00 )   PT: 14.1 sec;   INR: 1.18          PTT - ( 27 Jan 2022 15:00 )  PTT:32.7 sec

## 2022-01-30 LAB
ANION GAP SERPL CALC-SCNC: 7 MMOL/L — SIGNIFICANT CHANGE UP (ref 5–17)
BUN SERPL-MCNC: 11 MG/DL — SIGNIFICANT CHANGE UP (ref 7–23)
CALCIUM SERPL-MCNC: 9.1 MG/DL — SIGNIFICANT CHANGE UP (ref 8.4–10.5)
CHLORIDE SERPL-SCNC: 107 MMOL/L — SIGNIFICANT CHANGE UP (ref 96–108)
CO2 SERPL-SCNC: 28 MMOL/L — SIGNIFICANT CHANGE UP (ref 22–31)
CREAT SERPL-MCNC: 0.92 MG/DL — SIGNIFICANT CHANGE UP (ref 0.5–1.3)
GLUCOSE BLDC GLUCOMTR-MCNC: 106 MG/DL — HIGH (ref 70–99)
GLUCOSE BLDC GLUCOMTR-MCNC: 112 MG/DL — HIGH (ref 70–99)
GLUCOSE BLDC GLUCOMTR-MCNC: 120 MG/DL — HIGH (ref 70–99)
GLUCOSE BLDC GLUCOMTR-MCNC: 144 MG/DL — HIGH (ref 70–99)
GLUCOSE SERPL-MCNC: 114 MG/DL — HIGH (ref 70–99)
HCT VFR BLD CALC: 31 % — LOW (ref 34.5–45)
HGB BLD-MCNC: 10 G/DL — LOW (ref 11.5–15.5)
MAGNESIUM SERPL-MCNC: 1.8 MG/DL — SIGNIFICANT CHANGE UP (ref 1.6–2.6)
MCHC RBC-ENTMCNC: 26.2 PG — LOW (ref 27–34)
MCHC RBC-ENTMCNC: 32.3 GM/DL — SIGNIFICANT CHANGE UP (ref 32–36)
MCV RBC AUTO: 81.4 FL — SIGNIFICANT CHANGE UP (ref 80–100)
NRBC # BLD: 0 /100 WBCS — SIGNIFICANT CHANGE UP (ref 0–0)
PHOSPHATE SERPL-MCNC: 3.5 MG/DL — SIGNIFICANT CHANGE UP (ref 2.5–4.5)
PLATELET # BLD AUTO: 231 K/UL — SIGNIFICANT CHANGE UP (ref 150–400)
POTASSIUM SERPL-MCNC: 4.6 MMOL/L — SIGNIFICANT CHANGE UP (ref 3.5–5.3)
POTASSIUM SERPL-SCNC: 4.6 MMOL/L — SIGNIFICANT CHANGE UP (ref 3.5–5.3)
RBC # BLD: 3.81 M/UL — SIGNIFICANT CHANGE UP (ref 3.8–5.2)
RBC # FLD: 15.1 % — HIGH (ref 10.3–14.5)
SODIUM SERPL-SCNC: 142 MMOL/L — SIGNIFICANT CHANGE UP (ref 135–145)
WBC # BLD: 7.34 K/UL — SIGNIFICANT CHANGE UP (ref 3.8–10.5)
WBC # FLD AUTO: 7.34 K/UL — SIGNIFICANT CHANGE UP (ref 3.8–10.5)

## 2022-01-30 PROCEDURE — 71045 X-RAY EXAM CHEST 1 VIEW: CPT | Mod: 26

## 2022-01-30 RX ORDER — MAGNESIUM SULFATE 500 MG/ML
1 VIAL (ML) INJECTION ONCE
Refills: 0 | Status: COMPLETED | OUTPATIENT
Start: 2022-01-30 | End: 2022-01-30

## 2022-01-30 RX ORDER — AMLODIPINE BESYLATE 2.5 MG/1
5 TABLET ORAL DAILY
Refills: 0 | Status: DISCONTINUED | OUTPATIENT
Start: 2022-01-30 | End: 2022-02-02

## 2022-01-30 RX ORDER — LABETALOL HCL 100 MG
10 TABLET ORAL ONCE
Refills: 0 | Status: COMPLETED | OUTPATIENT
Start: 2022-01-30 | End: 2022-01-30

## 2022-01-30 RX ADMIN — Medication 10 MILLIGRAM(S): at 12:52

## 2022-01-30 RX ADMIN — Medication 650 MILLIGRAM(S): at 21:25

## 2022-01-30 RX ADMIN — CHLORHEXIDINE GLUCONATE 1 APPLICATION(S): 213 SOLUTION TOPICAL at 05:45

## 2022-01-30 RX ADMIN — Medication 5 MILLIGRAM(S): at 21:25

## 2022-01-30 RX ADMIN — Medication 650 MILLIGRAM(S): at 22:30

## 2022-01-30 RX ADMIN — SODIUM CHLORIDE 80 MILLILITER(S): 9 INJECTION, SOLUTION INTRAVENOUS at 06:35

## 2022-01-30 RX ADMIN — ENOXAPARIN SODIUM 30 MILLIGRAM(S): 100 INJECTION SUBCUTANEOUS at 21:25

## 2022-01-30 RX ADMIN — AMLODIPINE BESYLATE 5 MILLIGRAM(S): 2.5 TABLET ORAL at 14:49

## 2022-01-30 RX ADMIN — Medication 100 GRAM(S): at 10:40

## 2022-01-30 NOTE — PROGRESS NOTE ADULT - SUBJECTIVE AND OBJECTIVE BOX
INTERVAL HPI/OVERNIGHT EVENTS: , resolved, switched to D5NS    POD #3: lap distal gastrectomy     SUBJECTIVE:  Patient is doing well this morning, seen at bedside with chief, c/o some phlegm and a cough. Denies any nausea, vomiting, chest pain, shortness of breath, calf tenderness, fever or chills. Reports passing flatus, tolerating CLD, seen by PT OOB to chair    MEDICATIONS  (STANDING):  chlorhexidine 2% Cloths 1 Application(s) Topical <User Schedule>  dextrose 10%. 250 milliLiter(s) (250 mL/Hr) IV Continuous <Continuous>  dextrose 5% + sodium chloride 0.9%. 1000 milliLiter(s) (80 mL/Hr) IV Continuous <Continuous>  enoxaparin Injectable 30 milliGRAM(s) SubCutaneous every 24 hours  insulin lispro (ADMELOG) corrective regimen sliding scale   SubCutaneous every 6 hours  melatonin 5 milliGRAM(s) Oral at bedtime    MEDICATIONS  (PRN):  acetaminophen     Tablet .. 650 milliGRAM(s) Oral every 6 hours PRN Mild Pain (1 - 3), Moderate Pain (4 - 6)  benzocaine 15 mG/menthol 3.6 mG Lozenge 1 Lozenge Oral every 6 hours PRN Sore Throat  ondansetron Injectable 4 milliGRAM(s) IV Push every 4 hours PRN Nausea and/or Vomiting      Vital Signs Last 24 Hrs  T(C): 37.1 (30 Jan 2022 05:12), Max: 37.3 (29 Jan 2022 18:40)  T(F): 98.8 (30 Jan 2022 05:12), Max: 99.1 (29 Jan 2022 18:40)  HR: 70 (30 Jan 2022 04:20) (70 - 99)  BP: 168/71 (30 Jan 2022 04:20) (132/61 - 176/72)  BP(mean): 102 (30 Jan 2022 04:20) (88 - 111)  RR: 18 (30 Jan 2022 04:20) (12 - 34)  SpO2: 92% (30 Jan 2022 04:20) (92% - 100%)    PHYSICAL EXAM:  Constitutional: AAOx3, no acute distress  HEENT: NCAT, airway patent  Cardiovascular: RRR, pulses present bilaterally  Respiratory: nonlabored breathing, left sided chest tube in place to water seal, no surrounding erythema or edema, no purulent drainage  Gastrointestinal: abdomen soft, appropriately ttp, non distended, no rebound or guarding, NATALYA with serosang drainage  Neuro: no focal deficits      I&O's Detail    29 Jan 2022 07:01  -  30 Jan 2022 07:00  --------------------------------------------------------  IN:    dextrose 10% + sodium chloride 0.9%: 1200 mL    dextrose 5% + sodium chloride 0.9%: 480 mL    IV PiggyBack: 150 mL  Total IN: 1830 mL    OUT:    Bulb (mL): 390 mL    Chest Tube (mL): 210 mL    Indwelling Catheter - Urethral (mL): 460 mL    Voided (mL): 900 mL  Total OUT: 1960 mL    Total NET: -130 mL          LABS:                        10.0   7.34  )-----------( 231      ( 30 Jan 2022 07:19 )             31.0     01-30    142  |  107  |  11  ----------------------------<  114<H>  4.6   |  28  |  0.92    Ca    9.1      30 Jan 2022 07:19  Phos  3.5     01-30  Mg     1.8     01-30            RADIOLOGY & ADDITIONAL STUDIES:

## 2022-01-30 NOTE — PROGRESS NOTE ADULT - ATTENDING COMMENTS
as noted. looks well post extubation. NPO today
started on diet. pulmonary to make decision regarding chest tube removal. discharge planning
S/p gastrectomy; s/p chest tube placement for pleural effusion drainage. Keep chest tube to drain to gravity.
Acute hypercapnic and hypoxemic resp failure after gastrectomy with left pleural effusion  We have extubated her successfully, now on NC 2 LPM and comfortable  UO 20-40 ml/hr  Continue RL at 60/hr  Cleared for SQ heparin with the SCDs  Decision making of high complexity

## 2022-01-30 NOTE — PROGRESS NOTE ADULT - ASSESSMENT
A/p: 85 yo female with hx of lyme diseases, lupus and gastric adenocarcinoma presenting with a pleural effusion prior to surgery. Admitted to [facility] for observation and pleural effusion s/p pigtail drainage preop and s/p lap distal gastrectomy, D1+2 LAD, B2 recon (1/27). Difficult intubation, desaturations in PACU, reintubated.     PLAN:  - Pain/nausea control  - CLD/MIVF  - Melatonin for sleep  - Cepacol lozenge  - Holding ASA + statin  - D10%NS at 80  - DVT prophylaxis: SCD's, SQL 40  - ISS, holding Plaquenil   - OOB to chair  - AM labs

## 2022-01-31 LAB
ANION GAP SERPL CALC-SCNC: 11 MMOL/L — SIGNIFICANT CHANGE UP (ref 5–17)
BUN SERPL-MCNC: 11 MG/DL — SIGNIFICANT CHANGE UP (ref 7–23)
CALCIUM SERPL-MCNC: 8.4 MG/DL — SIGNIFICANT CHANGE UP (ref 8.4–10.5)
CHLORIDE SERPL-SCNC: 108 MMOL/L — SIGNIFICANT CHANGE UP (ref 96–108)
CO2 SERPL-SCNC: 21 MMOL/L — LOW (ref 22–31)
CREAT SERPL-MCNC: 0.94 MG/DL — SIGNIFICANT CHANGE UP (ref 0.5–1.3)
CULTURE RESULTS: NO GROWTH — SIGNIFICANT CHANGE UP
GLUCOSE BLDC GLUCOMTR-MCNC: 90 MG/DL — SIGNIFICANT CHANGE UP (ref 70–99)
GLUCOSE BLDC GLUCOMTR-MCNC: 95 MG/DL — SIGNIFICANT CHANGE UP (ref 70–99)
GLUCOSE BLDC GLUCOMTR-MCNC: 97 MG/DL — SIGNIFICANT CHANGE UP (ref 70–99)
GLUCOSE BLDC GLUCOMTR-MCNC: 98 MG/DL — SIGNIFICANT CHANGE UP (ref 70–99)
GLUCOSE SERPL-MCNC: 99 MG/DL — SIGNIFICANT CHANGE UP (ref 70–99)
HCT VFR BLD CALC: 33.1 % — LOW (ref 34.5–45)
HGB BLD-MCNC: 10.5 G/DL — LOW (ref 11.5–15.5)
MAGNESIUM SERPL-MCNC: 1.7 MG/DL — SIGNIFICANT CHANGE UP (ref 1.6–2.6)
MCHC RBC-ENTMCNC: 25.5 PG — LOW (ref 27–34)
MCHC RBC-ENTMCNC: 31.7 GM/DL — LOW (ref 32–36)
MCV RBC AUTO: 80.5 FL — SIGNIFICANT CHANGE UP (ref 80–100)
NRBC # BLD: 0 /100 WBCS — SIGNIFICANT CHANGE UP (ref 0–0)
PHOSPHATE SERPL-MCNC: 4.1 MG/DL — SIGNIFICANT CHANGE UP (ref 2.5–4.5)
PLATELET # BLD AUTO: 255 K/UL — SIGNIFICANT CHANGE UP (ref 150–400)
POTASSIUM SERPL-MCNC: 4 MMOL/L — SIGNIFICANT CHANGE UP (ref 3.5–5.3)
POTASSIUM SERPL-SCNC: 4 MMOL/L — SIGNIFICANT CHANGE UP (ref 3.5–5.3)
RBC # BLD: 4.11 M/UL — SIGNIFICANT CHANGE UP (ref 3.8–5.2)
RBC # FLD: 15.2 % — HIGH (ref 10.3–14.5)
SODIUM SERPL-SCNC: 140 MMOL/L — SIGNIFICANT CHANGE UP (ref 135–145)
SPECIMEN SOURCE: SIGNIFICANT CHANGE UP
WBC # BLD: 6.76 K/UL — SIGNIFICANT CHANGE UP (ref 3.8–10.5)
WBC # FLD AUTO: 6.76 K/UL — SIGNIFICANT CHANGE UP (ref 3.8–10.5)

## 2022-01-31 PROCEDURE — 71045 X-RAY EXAM CHEST 1 VIEW: CPT | Mod: 26

## 2022-01-31 RX ORDER — MAGNESIUM SULFATE 500 MG/ML
1 VIAL (ML) INJECTION ONCE
Refills: 0 | Status: COMPLETED | OUTPATIENT
Start: 2022-01-31 | End: 2022-01-31

## 2022-01-31 RX ADMIN — Medication 100 GRAM(S): at 07:18

## 2022-01-31 RX ADMIN — ENOXAPARIN SODIUM 30 MILLIGRAM(S): 100 INJECTION SUBCUTANEOUS at 23:38

## 2022-01-31 RX ADMIN — Medication 650 MILLIGRAM(S): at 19:23

## 2022-01-31 RX ADMIN — Medication 650 MILLIGRAM(S): at 14:32

## 2022-01-31 RX ADMIN — CHLORHEXIDINE GLUCONATE 1 APPLICATION(S): 213 SOLUTION TOPICAL at 05:44

## 2022-01-31 RX ADMIN — Medication 650 MILLIGRAM(S): at 19:41

## 2022-01-31 RX ADMIN — AMLODIPINE BESYLATE 5 MILLIGRAM(S): 2.5 TABLET ORAL at 05:39

## 2022-01-31 RX ADMIN — Medication 650 MILLIGRAM(S): at 13:28

## 2022-01-31 NOTE — PROVIDER CONTACT NOTE (OTHER) - ASSESSMENT
Patient's vitals /74, HR 82, RR 16, SPO2 95, MARGE Rogers made aware
AOx4. VS as noted.  Pt c/o of pain 6/10. No nonverbal indicators of pain present.  Lap sites x4 with liquid bandage. (+) abdominal tenderness.
AOx3. Able to shake head yes or no and communicate via writing.  Moves all extremities to command.

## 2022-01-31 NOTE — PROVIDER CONTACT NOTE (OTHER) - ACTION/TREATMENT ORDERED:
LR 500cc bolus over 1hr
Will continue to monitor for vital sign changes
Acetaminophen 1g IVPB. Diphenhydramine 25mg IVP.

## 2022-01-31 NOTE — PROVIDER CONTACT NOTE (OTHER) - BACKGROUND
87 y/o female s/p lap distal gastrectomy
s/p lap distal gastrectomy 1/27
s/p lap distal gastrectomy 1/27

## 2022-01-31 NOTE — PROVIDER CONTACT NOTE (OTHER) - SITUATION
Pt requesting medication for pain and to sleep.
Patient is hypertensive
Urine output 15cc in last hour

## 2022-01-31 NOTE — PROVIDER CONTACT NOTE (OTHER) - REASON
Urine output 15cc in last hour
Patient is hypertensive
Pt requesting medication for pain and to sleep.

## 2022-01-31 NOTE — PROGRESS NOTE ADULT - ASSESSMENT
A/p: 87 yo female with hx of lyme diseases, lupus and gastric adenocarcinoma presenting with a pleural effusion prior to surgery. Admitted to [facility] for observation and pleural effusion s/p pigtail drainage preop and s/p lap distal gastrectomy, D1+2 LAD, B2 recon (1/27). Difficult intubation, desaturations in PACU, reintubated, Extubated on to NC on 1/28, CT to waterseal as of 1/30.    PLAN:  - Pain/nausea control  - post gastrectomy full diet/MIVF  - Melatonin for sleep  - Cepacol lozenge  - Holding ASA + statin  - D10%NS at 80  - DVT prophylaxis: SCD's, SQL 40  - ISS, holding Plaquenil   - OOB to chair  - AM labs   A/p: 85 yo female with hx of lyme diseases, lupus and gastric adenocarcinoma presenting with a pleural effusion prior to surgery. Admitted to [facility] for observation and pleural effusion s/p pigtail drainage preop and s/p lap distal gastrectomy, D1+2 LAD, B2 recon (1/27). Difficult intubation, desaturations in PACU, reintubated, Extubated on to NC on 1/28, CT to waterseal as of 1/30 now removed. stepdown to regional     PLAN:  - dc IVF today  - Pain/nausea control  - post gastrectomy full diet  - Melatonin for sleep  - Cepacol lozenge  - Holding ASA & statin  - DVT prophylaxis: SCD's, SQL 40  - ISS, holding Plaquenil   - OOB to chair

## 2022-01-31 NOTE — PROGRESS NOTE ADULT - SUBJECTIVE AND OBJECTIVE BOX
STATUS POST:  distal gastrectomy    POST OPERATIVE DAY #: 4    SUBJECTIVE:  Examined at bedside this morning, she feels well; her pain is well-controlled. Tolerating diet without nausea or vomiting. Passing flatus, no BMs. No acute complaints.      MEDICATIONS  (STANDING):  amLODIPine   Tablet 5 milliGRAM(s) Oral daily  chlorhexidine 2% Cloths 1 Application(s) Topical <User Schedule>  dextrose 5% + sodium chloride 0.9%. 1000 milliLiter(s) (60 mL/Hr) IV Continuous <Continuous>  enoxaparin Injectable 30 milliGRAM(s) SubCutaneous every 24 hours  insulin lispro (ADMELOG) corrective regimen sliding scale   SubCutaneous every 6 hours  melatonin 5 milliGRAM(s) Oral at bedtime    MEDICATIONS  (PRN):  acetaminophen     Tablet .. 650 milliGRAM(s) Oral every 6 hours PRN Mild Pain (1 - 3), Moderate Pain (4 - 6)  benzocaine 15 mG/menthol 3.6 mG Lozenge 1 Lozenge Oral every 6 hours PRN Sore Throat  ondansetron Injectable 4 milliGRAM(s) IV Push every 4 hours PRN Nausea and/or Vomiting      Vital Signs Last 24 Hrs  T(C): 36.3 (31 Jan 2022 04:10), Max: 36.9 (30 Jan 2022 10:00)  T(F): 97.4 (31 Jan 2022 04:10), Max: 98.4 (30 Jan 2022 10:00)  HR: 70 (31 Jan 2022 04:25) (70 - 94)  BP: 110/56 (31 Jan 2022 04:25) (110/56 - 180/78)  BP(mean): 80 (31 Jan 2022 04:25) (80 - 112)  RR: 16 (31 Jan 2022 04:25) (16 - 18)  SpO2: 94% (31 Jan 2022 04:25) (94% - 96%)    PHYSICAL EXAM:      Constitutional: A&Ox3    Breasts:    Respiratory: non labored breathing, no respiratory distress    Cardiovascular: NSR, RRR    Gastrointestinal: soft, non-tender, non-distended, no rebound, no guarding, NATALYA drain secured with serosang fluid collected                 Incision: clean, dry, intact    Genitourinary: voiding    Extremities: (-) edema, wwp                  I&O's Detail    29 Jan 2022 07:01  -  30 Jan 2022 07:00  --------------------------------------------------------  IN:    dextrose 10% + sodium chloride 0.9%: 1200 mL    dextrose 5% + sodium chloride 0.9%: 480 mL    IV PiggyBack: 150 mL  Total IN: 1830 mL    OUT:    Bulb (mL): 390 mL    Chest Tube (mL): 210 mL    Indwelling Catheter - Urethral (mL): 460 mL    Voided (mL): 900 mL  Total OUT: 1960 mL    Total NET: -130 mL      30 Jan 2022 07:01  -  31 Jan 2022 06:57  --------------------------------------------------------  IN:    dextrose 5% + sodium chloride 0.9%: 540 mL  Total IN: 540 mL    OUT:    Bulb (mL): 370 mL    Chest Tube (mL): 40 mL    Voided (mL): 1300 mL  Total OUT: 1710 mL    Total NET: -1170 mL          LABS:                        10.5   6.76  )-----------( 255      ( 31 Jan 2022 06:02 )             33.1     01-31    140  |  108  |  11  ----------------------------<  99  4.0   |  21<L>  |  0.94    Ca    8.4      31 Jan 2022 06:02  Phos  4.1     01-31  Mg     1.7     01-31            RADIOLOGY & ADDITIONAL STUDIES:

## 2022-02-01 ENCOUNTER — TRANSCRIPTION ENCOUNTER (OUTPATIENT)
Age: 87
End: 2022-02-01

## 2022-02-01 LAB
GLUCOSE BLDC GLUCOMTR-MCNC: 101 MG/DL — HIGH (ref 70–99)
GLUCOSE BLDC GLUCOMTR-MCNC: 146 MG/DL — HIGH (ref 70–99)
GLUCOSE BLDC GLUCOMTR-MCNC: 72 MG/DL — SIGNIFICANT CHANGE UP (ref 70–99)
GLUCOSE BLDC GLUCOMTR-MCNC: 82 MG/DL — SIGNIFICANT CHANGE UP (ref 70–99)

## 2022-02-01 PROCEDURE — 71045 X-RAY EXAM CHEST 1 VIEW: CPT | Mod: 26

## 2022-02-01 RX ORDER — ASPIRIN/CALCIUM CARB/MAGNESIUM 324 MG
81 TABLET ORAL DAILY
Refills: 0 | Status: DISCONTINUED | OUTPATIENT
Start: 2022-02-01 | End: 2022-02-02

## 2022-02-01 RX ORDER — ATORVASTATIN CALCIUM 80 MG/1
80 TABLET, FILM COATED ORAL AT BEDTIME
Refills: 0 | Status: DISCONTINUED | OUTPATIENT
Start: 2022-02-01 | End: 2022-02-01

## 2022-02-01 RX ORDER — GABAPENTIN 400 MG/1
100 CAPSULE ORAL EVERY 8 HOURS
Refills: 0 | Status: DISCONTINUED | OUTPATIENT
Start: 2022-02-01 | End: 2022-02-02

## 2022-02-01 RX ORDER — ATORVASTATIN CALCIUM 80 MG/1
20 TABLET, FILM COATED ORAL AT BEDTIME
Refills: 0 | Status: DISCONTINUED | OUTPATIENT
Start: 2022-02-01 | End: 2022-02-02

## 2022-02-01 RX ORDER — HYDROXYCHLOROQUINE SULFATE 200 MG
200 TABLET ORAL DAILY
Refills: 0 | Status: DISCONTINUED | OUTPATIENT
Start: 2022-02-01 | End: 2022-02-02

## 2022-02-01 RX ORDER — DULOXETINE HYDROCHLORIDE 30 MG/1
20 CAPSULE, DELAYED RELEASE ORAL DAILY
Refills: 0 | Status: DISCONTINUED | OUTPATIENT
Start: 2022-02-01 | End: 2022-02-02

## 2022-02-01 RX ADMIN — AMLODIPINE BESYLATE 5 MILLIGRAM(S): 2.5 TABLET ORAL at 06:14

## 2022-02-01 RX ADMIN — DULOXETINE HYDROCHLORIDE 20 MILLIGRAM(S): 30 CAPSULE, DELAYED RELEASE ORAL at 22:03

## 2022-02-01 RX ADMIN — GABAPENTIN 100 MILLIGRAM(S): 400 CAPSULE ORAL at 22:03

## 2022-02-01 RX ADMIN — CHLORHEXIDINE GLUCONATE 1 APPLICATION(S): 213 SOLUTION TOPICAL at 06:14

## 2022-02-01 RX ADMIN — ATORVASTATIN CALCIUM 20 MILLIGRAM(S): 80 TABLET, FILM COATED ORAL at 22:43

## 2022-02-01 RX ADMIN — ENOXAPARIN SODIUM 30 MILLIGRAM(S): 100 INJECTION SUBCUTANEOUS at 22:03

## 2022-02-01 NOTE — DISCHARGE NOTE PROVIDER - NSDCCPCAREPLAN_GEN_ALL_CORE_FT
PRINCIPAL DISCHARGE DIAGNOSIS  Diagnosis: Gastric adenocarcinoma  Assessment and Plan of Treatment:       SECONDARY DISCHARGE DIAGNOSES  Diagnosis: Pleural effusion  Assessment and Plan of Treatment:

## 2022-02-01 NOTE — PROGRESS NOTE ADULT - ASSESSMENT
85 yo female with hx of lyme diseases, lupus and gastric adenocarcinoma presenting with a pleural effusion prior to surgery. Admitted to tele for observation and pleural effusion s/p pigtail drainage preop and s/p lap distal gastrectomy, D1+2 LAD, B2 recon (1/27). Difficult intubation, desaturations in PACU, reintubated, Extubated on to NC on 1/28, CT to waterseal as of 1/30 now removed. stepdown to regional     PLAN:  - Pain/nausea control  - post gastrectomy full diet  - Melatonin for sleep  - Cepacol lozenge  - Holding ASA + statin  - DVT prophylaxis: SCD's, SQL 40  - ISS, holding Plaquenil   - OOB to chair  -Dispo: pending VERONICA

## 2022-02-01 NOTE — DISCHARGE NOTE PROVIDER - HOSPITAL COURSE
87 yo female with hx of lyme diseases, lupus and gastric adenocarcinoma presenting with a pleural effusion prior to surgery. Admitted to [facility] for observation and pleural effusion s/p pigtail drainage preop and s/p lap distal gastrectomy, D1+2 LAD, B2 recon (1/27). Difficult intubation, desaturations in PACU, reintubated, Extubated on to NC on 1/28, CT to waterseal as of 1/30 now removed. stepdown to regional 87 yo female with hx of lyme diseases, lupus and gastric adenocarcinoma presents with a pleural effusion prior to surgery. She was admitted to [facility] for observation on 1/26, and a pigtail drain was placed by pulmonary. She was then taken to the operating room and had a laparoscopic distal gastrectomy D1+2 LAD, B2 recon (1/27). She had a difficult intubation, and desaturated in the PACU and was subsequently reintubated. She was sent to SICU and was extubated to NC on 1/28. On 1/29, she was stepped down to SDU/telemetry, and her chest tube was removed on 1/30. She was then stepped down to regional on 1/31. During her hospital stay, her pain is controlled, she is tolerating a regular bariatric diet, she is ambulating and urinating appropriately, and is hemodynamically stable to be discharged to rehab.

## 2022-02-01 NOTE — PROGRESS NOTE ADULT - SUBJECTIVE AND OBJECTIVE BOX
GENERAL SURGERY PROGRESS NOTE    SUBJECTIVE: Patient seen and examined bedside with senior resident. No acute events overnight. Patient this morning feels okay, no acute complaints. No n/v, tolerating diet. Is passing +flatus, last BM was prior to admission.    amLODIPine   Tablet 5 milliGRAM(s) Oral daily  enoxaparin Injectable 30 milliGRAM(s) SubCutaneous every 24 hours      Vital Signs Last 24 Hrs  T(C): 36.8 (01 Feb 2022 05:00), Max: 37.6 (01 Feb 2022 00:00)  T(F): 98.3 (01 Feb 2022 05:00), Max: 99.7 (01 Feb 2022 00:00)  HR: 73 (01 Feb 2022 05:00) (73 - 87)  BP: 132/67 (01 Feb 2022 05:00) (128/68 - 168/74)  BP(mean): 89 (01 Feb 2022 05:00) (89 - 106)  RR: 17 (01 Feb 2022 05:00) (16 - 17)  SpO2: 96% (01 Feb 2022 05:00) (95% - 100%)  I&O's Detail    31 Jan 2022 07:01  -  01 Feb 2022 07:00  --------------------------------------------------------  IN:    dextrose 5% + sodium chloride 0.9%: 60 mL    Oral Fluid: 1150 mL  Total IN: 1210 mL    OUT:    Bulb (mL): 355 mL    Voided (mL): 1500 mL  Total OUT: 1855 mL    Total NET: -645 mL          PHYSICAL EXAM    General: NAD, resting comfortably in bed  C/V: NSR  Pulm: Nonlabored breathing, no respiratory distress on room air  Abd: soft, ND, appropriate incisional tenderness, no rebound tenderness, no guarding, NATALYA drain draining SS fluid.  Extrem: WWP, no edema, SCDs in place        LABS:                        10.5   6.76  )-----------( 255      ( 31 Jan 2022 06:02 )             33.1     01-31    140  |  108  |  11  ----------------------------<  99  4.0   |  21<L>  |  0.94    Ca    8.4      31 Jan 2022 06:02  Phos  4.1     01-31  Mg     1.7     01-31            RADIOLOGY & ADDITIONAL STUDIES:

## 2022-02-01 NOTE — DISCHARGE NOTE PROVIDER - NSDCMRMEDTOKEN_GEN_ALL_CORE_FT
alendronate weekly: 20 milligram(s) orally once a week  aspirin 81 mg oral tablet, chewable: 1 tab(s) orally once a day  DULoxetine 20 mg oral delayed release capsule: orally once a day  gabapentin 300 mg oral capsule: 1 cap(s) orally 2 times a day  hydroxychloroquine 200 mg oral tablet: 1 tab(s) orally once a day  latanoprost 0.005% ophthalmic solution: 1 drop(s) to each affected eye once a day (in the evening)  rosuvastatin 20 mg oral tablet: 1 tab(s) orally once a day   acetaminophen 325 mg oral tablet: 2 tab(s) orally every 6 hours, As needed, Mild Pain (1 - 3), Moderate Pain (4 - 6)  alendronate weekly: 20 milligram(s) orally once a week  aspirin 81 mg oral tablet, chewable: 1 tab(s) orally once a day  DULoxetine 20 mg oral delayed release capsule: orally once a day  gabapentin 300 mg oral capsule: 1 cap(s) orally 2 times a day  hydroxychloroquine 200 mg oral tablet: 1 tab(s) orally once a day  latanoprost 0.005% ophthalmic solution: 1 drop(s) to each affected eye once a day (in the evening)  rosuvastatin 20 mg oral tablet: 1 tab(s) orally once a day

## 2022-02-01 NOTE — CHART NOTE - NSCHARTNOTEFT_GEN_A_CORE
Admitting Diagnosis:   Patient is a 86y old  Female who presents with a chief complaint of lap gastrectomy (30 Jan 2022 08:33)    PAST MEDICAL & SURGICAL HISTORY:  Stroke  H/O pulmonary valve disease  Malignancy  GBS (Guillain Hamilton syndrome)  LE (lupus erythematosus)  Pleural effusion, left  Gastric cancer  Anemia due to acute blood loss  Gastric ulcer  H/O Helicobacter infection  H/o Lyme disease  History of TIA due to embolism  No significant past surgical history      Current Nutrition Order:  Diet, Regular:   Phase 3 Bariatric Regular ([id number])  Supplement Feeding Modality:  Oral  Ensure Enlive Cans or Servings Per Day:  1       Frequency:  Two Times a day (01-30-22 @ 13:40)    PO Intake: Good (%) [   ]  Fair (~50%) [ x  ] Poor (<25%) [   ]  -Spoke with pt in room this morning. States appetite is fair, does report early satiety. Had not received breakfast yet this morning, but for dinner last night ate 50% of salmon and mash potatoes. Unsure if had tried Ensure Enlive.   *Provided pt Gastric surgery nutrition therapy handout and reviewed material. Discussed importance of hydration, eating small frequent meals, and eating protein at every meal/ snack. Briefly reviewed foods recommended and foods to limit, however, informed everyone is different with tolerance so try foods on limit/ avoid list one at a time to assess tolerance.     GI Issues: Last BM 1/26 (no BM x 6 days). Pt states takes bowel meds (miralax/ senna) at home to help with BMs. Informed RN about need for bowel med. Denies N/V   Pain: Denies   Skin Integrity: Surgical incision to abd, no pressure breakdown   Ramo Score: 18, No edema     Labs: GFR 55 L  01-31    140  |  108  |  11  ----------------------------<  99  4.0   |  21<L>  |  0.94    Ca    8.4      31 Jan 2022 06:02  Phos  4.1     01-31  Mg     1.7     01-31    POCT Blood Glucose.: 82 mg/dL (01 Feb 2022 05:50)  POCT Blood Glucose.: 90 mg/dL (31 Jan 2022 23:54)  POCT Blood Glucose.: 98 mg/dL (31 Jan 2022 17:20)    Medications:  MEDICATIONS  (STANDING):  amLODIPine   Tablet 5 milliGRAM(s) Oral daily  chlorhexidine 2% Cloths 1 Application(s) Topical <User Schedule>  enoxaparin Injectable 30 milliGRAM(s) SubCutaneous every 24 hours  insulin lispro (ADMELOG) corrective regimen sliding scale   SubCutaneous every 6 hours  melatonin 5 milliGRAM(s) Oral at bedtime    MEDICATIONS  (PRN):  acetaminophen     Tablet .. 650 milliGRAM(s) Oral every 6 hours PRN Mild Pain (1 - 3), Moderate Pain (4 - 6)  benzocaine 15 mG/menthol 3.6 mG Lozenge 1 Lozenge Oral every 6 hours PRN Sore Throat  ondansetron Injectable 4 milliGRAM(s) IV Push every 4 hours PRN Nausea and/or Vomiting    Anthropometrics:  Ht: 154.9cm (61")  Wt: 50.8kg (1/27/22)-Admit    IBW: 105# (47.7kg)   % IBW: 106%    Weight Change: No new weight since admission.     Nutrition Focused Physical Exam: Completed [ x  ]  Not Pertinent [   ]  -Completed 1/28    Estimated energy needs: 50.8kg  ABW for estimated needs d/t % IBW between %, adjusted for post-op recovery, malignancy, age   Calories: 1450-1650kcals (28-32kcals/kg)  Protein: 60-75g (1.2-1.5g/kg)  Fluid: 1500-1800ml (30-35ml/kg)    Subjective:   87 yo female with hx of lyme diseases, lupus and gastric adenocarcinoma presenting with a pleural effusion prior to surgery. Admitted to [facility] for observation and pleural effusion s/p pigtail drainage preop and s/p lap distal gastrectomy, D1+2 LAD, B2 recon (1/27). Difficult intubation, desaturations in PACU, reintubated, Extubated on to NC on 1/28, CT to waterseal as of 1/30 now removed. Stepdown to regional and now plan for VERONICA at d/c. SW following for VERONICA set-up. Advanced to Ap Phase 3 Regular diet on 1/30 and tolerating without N/V or abd distention. Does report early satiety which is expected. Provided Gastric surgery nutrition therapy education with handout.     Previous Nutrition Diagnosis: Inadequate oral intake R/T inability to meet EER AEB NPO    Active [   ]  Resolved [ x  ]    If resolved, new PES: Increased nutrient needs R/T hypermetabolic post-op AEB 28-32kcals/kg protein and 1.2-1.5g/kg protein of ABW    Goal: Pt to meet >75% of EER via po intake     Recommendations:  1. Continue Ap Phase 3 regular diet  -Ensure Enlive BID (provides 350 kcals, 20g protien)  -Encouraged po intake, food first at meals then drink ONS  2. Start bowel regimen (no BM x6 days-informed RN)   3. Monitor labs     Education: Provided Gastric surgery nutrition therapy edu. Answered pt's questions and verbalized understanding.     Risk Level: High [   ] Moderate [ x  ] Low [   ]  Abena Rasmussen RD,CDN,,CNSC

## 2022-02-01 NOTE — DISCHARGE NOTE PROVIDER - NSDCFUADDINST_GEN_ALL_CORE_FT
General Instructions:  - Please follow-up with Dr. Soto by calling to make an appointment in 1 week.  - Please resume all regular home medications unless specifically advised not to take a particular medication. Also, please take any new medications as prescribed.   - Please get plenty of rest, continue to ambulate several times per day, and drink adequate amounts of fluids. Avoid lifting weights greater than 5-10 lbs until you follow-up with your surgeon, who will instruct you further regarding activity restrictions.  - Avoid driving or operating heavy machinery while taking pain medications. Please follow-up with your surgeon and Primary Care Provider (PCP) as advised.    . General Instructions:  - Please follow-up with Dr. Soto by calling to make an appointment in 1 week.  - Please resume all regular home medications unless specifically advised not to take a particular medication. Also, please take any new medications as prescribed.   - Please get plenty of rest, continue to ambulate several times per day, and drink adequate amounts of fluids. Avoid lifting weights greater than 5-10 lbs until you follow-up with your surgeon, who will instruct you further regarding activity restrictions.  - Avoid driving or operating heavy machinery while taking pain medications. Please follow-up with your surgeon and Primary Care Provider (PCP) as advised.    Wound Care  Please call your doctor or nurse practitioner if you have:  - Increased pain, swelling, redness, or drainage from the incision site.   - Avoid swimming and baths until your follow-up appointment. You may shower, and wash surgical incisions with a mild soap and warm water. Gently pat the area dry. If you have staples, they will be removed at your follow-up appointment.    Warning Signs  Please call your doctor or nurse practitioner if you experience the following:   - You experience new chest pain, pressure, squeezing or tightness., New or worsening cough, shortness of breath, or wheeze.   - If you are vomiting and cannot keep down fluids or your medications. You are getting dehydrated due to continued vomiting, diarrhea, or other reasons. Signs of dehydration include dry mouth, rapid heartbeat, or feeling dizzy or faint when standing.  - You see blood or dark/black material when you vomit or have a bowel movement.  You experience burning when you urinate, have blood in your urine, or experience a discharge.    - Your pain is not improving within 8-12 hours or is not gone within 24 hours. Call or return immediately if your pain is getting worse, changes location, or moves to your chest or back.  You have shaking chills, or fever greater than 101.5 degrees Fahrenheit or 38 degrees Celsius.  Any change in your symptoms, or any new symptoms that concern you.

## 2022-02-01 NOTE — DISCHARGE NOTE PROVIDER - CARE PROVIDER_API CALL
Gab Soto)  Surgery  [address] [address]  Phone: [phone]  Fax: [phone]  Follow Up Time: 1 week

## 2022-02-02 ENCOUNTER — TRANSCRIPTION ENCOUNTER (OUTPATIENT)
Age: 87
End: 2022-02-02

## 2022-02-02 VITALS
HEART RATE: 82 BPM | SYSTOLIC BLOOD PRESSURE: 113 MMHG | OXYGEN SATURATION: 95 % | DIASTOLIC BLOOD PRESSURE: 68 MMHG | TEMPERATURE: 98 F | RESPIRATION RATE: 18 BRPM

## 2022-02-02 LAB
GLUCOSE BLDC GLUCOMTR-MCNC: 135 MG/DL — HIGH (ref 70–99)
GLUCOSE BLDC GLUCOMTR-MCNC: 84 MG/DL — SIGNIFICANT CHANGE UP (ref 70–99)
SARS-COV-2 RNA SPEC QL NAA+PROBE: SIGNIFICANT CHANGE UP

## 2022-02-02 PROCEDURE — 88112 CYTOPATH CELL ENHANCE TECH: CPT

## 2022-02-02 PROCEDURE — 82947 ASSAY GLUCOSE BLOOD QUANT: CPT

## 2022-02-02 PROCEDURE — 82570 ASSAY OF URINE CREATININE: CPT

## 2022-02-02 PROCEDURE — 87635 SARS-COV-2 COVID-19 AMP PRB: CPT

## 2022-02-02 PROCEDURE — 86900 BLOOD TYPING SEROLOGIC ABO: CPT

## 2022-02-02 PROCEDURE — 80048 BASIC METABOLIC PNL TOTAL CA: CPT

## 2022-02-02 PROCEDURE — 87205 SMEAR GRAM STAIN: CPT

## 2022-02-02 PROCEDURE — 97116 GAIT TRAINING THERAPY: CPT

## 2022-02-02 PROCEDURE — 94003 VENT MGMT INPAT SUBQ DAY: CPT

## 2022-02-02 PROCEDURE — 82945 GLUCOSE OTHER FLUID: CPT

## 2022-02-02 PROCEDURE — 87102 FUNGUS ISOLATION CULTURE: CPT

## 2022-02-02 PROCEDURE — 82330 ASSAY OF CALCIUM: CPT

## 2022-02-02 PROCEDURE — 87075 CULTR BACTERIA EXCEPT BLOOD: CPT

## 2022-02-02 PROCEDURE — 87206 SMEAR FLUORESCENT/ACID STAI: CPT

## 2022-02-02 PROCEDURE — 94002 VENT MGMT INPAT INIT DAY: CPT

## 2022-02-02 PROCEDURE — 86850 RBC ANTIBODY SCREEN: CPT

## 2022-02-02 PROCEDURE — 83986 ASSAY PH BODY FLUID NOS: CPT

## 2022-02-02 PROCEDURE — 85610 PROTHROMBIN TIME: CPT

## 2022-02-02 PROCEDURE — 86923 COMPATIBILITY TEST ELECTRIC: CPT

## 2022-02-02 PROCEDURE — U0003: CPT

## 2022-02-02 PROCEDURE — 97162 PT EVAL MOD COMPLEX 30 MIN: CPT

## 2022-02-02 PROCEDURE — 83880 ASSAY OF NATRIURETIC PEPTIDE: CPT

## 2022-02-02 PROCEDURE — 84311 SPECTROPHOTOMETRY: CPT

## 2022-02-02 PROCEDURE — 83605 ASSAY OF LACTIC ACID: CPT

## 2022-02-02 PROCEDURE — P9016: CPT

## 2022-02-02 PROCEDURE — 89051 BODY FLUID CELL COUNT: CPT

## 2022-02-02 PROCEDURE — 84295 ASSAY OF SERUM SODIUM: CPT

## 2022-02-02 PROCEDURE — 85014 HEMATOCRIT: CPT

## 2022-02-02 PROCEDURE — 86901 BLOOD TYPING SEROLOGIC RH(D): CPT

## 2022-02-02 PROCEDURE — 97530 THERAPEUTIC ACTIVITIES: CPT

## 2022-02-02 PROCEDURE — 84132 ASSAY OF SERUM POTASSIUM: CPT

## 2022-02-02 PROCEDURE — U0005: CPT

## 2022-02-02 PROCEDURE — 83735 ASSAY OF MAGNESIUM: CPT

## 2022-02-02 PROCEDURE — 87015 SPECIMEN INFECT AGNT CONCNTJ: CPT

## 2022-02-02 PROCEDURE — 84100 ASSAY OF PHOSPHORUS: CPT

## 2022-02-02 PROCEDURE — 71045 X-RAY EXAM CHEST 1 VIEW: CPT

## 2022-02-02 PROCEDURE — 85018 HEMOGLOBIN: CPT

## 2022-02-02 PROCEDURE — 80053 COMPREHEN METABOLIC PANEL: CPT

## 2022-02-02 PROCEDURE — 87070 CULTURE OTHR SPECIMN AEROBIC: CPT

## 2022-02-02 PROCEDURE — 82803 BLOOD GASES ANY COMBINATION: CPT

## 2022-02-02 PROCEDURE — 88342 IMHCHEM/IMCYTCHM 1ST ANTB: CPT

## 2022-02-02 PROCEDURE — 85025 COMPLETE CBC W/AUTO DIFF WBC: CPT

## 2022-02-02 PROCEDURE — 87116 MYCOBACTERIA CULTURE: CPT

## 2022-02-02 PROCEDURE — 88309 TISSUE EXAM BY PATHOLOGIST: CPT

## 2022-02-02 PROCEDURE — 85730 THROMBOPLASTIN TIME PARTIAL: CPT

## 2022-02-02 PROCEDURE — 83615 LACTATE (LD) (LDH) ENZYME: CPT

## 2022-02-02 PROCEDURE — 84157 ASSAY OF PROTEIN OTHER: CPT

## 2022-02-02 PROCEDURE — 82962 GLUCOSE BLOOD TEST: CPT

## 2022-02-02 PROCEDURE — 36415 COLL VENOUS BLD VENIPUNCTURE: CPT

## 2022-02-02 PROCEDURE — 85027 COMPLETE CBC AUTOMATED: CPT

## 2022-02-02 PROCEDURE — 82042 OTHER SOURCE ALBUMIN QUAN EA: CPT

## 2022-02-02 PROCEDURE — 36430 TRANSFUSION BLD/BLD COMPNT: CPT

## 2022-02-02 PROCEDURE — 88305 TISSUE EXAM BY PATHOLOGIST: CPT

## 2022-02-02 PROCEDURE — 84478 ASSAY OF TRIGLYCERIDES: CPT

## 2022-02-02 PROCEDURE — C1889: CPT

## 2022-02-02 PROCEDURE — 88341 IMHCHEM/IMCYTCHM EA ADD ANTB: CPT

## 2022-02-02 RX ORDER — ACETAMINOPHEN 500 MG
2 TABLET ORAL
Qty: 0 | Refills: 0 | DISCHARGE
Start: 2022-02-02

## 2022-02-02 RX ADMIN — Medication 81 MILLIGRAM(S): at 11:13

## 2022-02-02 RX ADMIN — DULOXETINE HYDROCHLORIDE 20 MILLIGRAM(S): 30 CAPSULE, DELAYED RELEASE ORAL at 11:13

## 2022-02-02 RX ADMIN — Medication 200 MILLIGRAM(S): at 11:14

## 2022-02-02 RX ADMIN — AMLODIPINE BESYLATE 5 MILLIGRAM(S): 2.5 TABLET ORAL at 06:06

## 2022-02-02 RX ADMIN — CHLORHEXIDINE GLUCONATE 1 APPLICATION(S): 213 SOLUTION TOPICAL at 06:07

## 2022-02-02 RX ADMIN — GABAPENTIN 100 MILLIGRAM(S): 400 CAPSULE ORAL at 13:41

## 2022-02-02 RX ADMIN — GABAPENTIN 100 MILLIGRAM(S): 400 CAPSULE ORAL at 06:06

## 2022-02-02 RX ADMIN — BENZOCAINE AND MENTHOL 1 LOZENGE: 5; 1 LIQUID ORAL at 08:28

## 2022-02-02 NOTE — PROGRESS NOTE ADULT - ASSESSMENT
87 yo female with hx of lyme diseases, lupus and gastric adenocarcinoma presenting with a pleural effusion prior to surgery. Admitted to [facility] for observation and pleural effusion s/p pigtail drainage preop and s/p lap distal gastrectomy, D1+2 LAD, B2 recon (1/27). Difficult intubation, desaturations in PACU, reintubated, Extubated on to NC on 1/28, CT to waterseal as of 1/30 now removed. Stepped down to regional. HD stable.    - Pain/nausea control  - Post gastrectomy full diet  - Melatonin for sleep  - Cepacol lozenge  - Restarted home meds (asa, statin, gabapentin, duloxetine, hydroxychlorquine)  - DVT prophylaxis: SCD's, Lovenox  - ISS, holding Plaquenil   - OOB to chair/IS  - Dispo: pending VERONICA

## 2022-02-02 NOTE — DISCHARGE NOTE NURSING/CASE MANAGEMENT/SOCIAL WORK - PATIENT PORTAL LINK FT
You can access the FollowMyHealth Patient Portal offered by [facility] by registering at the following website: [email or web address]. By joining [facility]’s FollowMyHealth portal, you will also be able to view your health information using other applications (apps) compatible with our system.

## 2022-02-02 NOTE — PROGRESS NOTE ADULT - SUBJECTIVE AND OBJECTIVE BOX
INTERVAL HPI/OVERNIGHT EVENTS: encouraged IS use, pt takes 20mg of liptor not 80mg, adjusted     STATUS POST: 1/27: Lap distal gastrectomy, D1+2 LAD, B2 recon    POST OPERATIVE DAY #: 6    SUBJECTIVE: Pt seen and examined at bedside this am by surgery team. No acute complaints. Tolerating diet, pain well controlled. -F/-BM. Denies f/n/v/cp/sob.    MEDICATIONS  (STANDING):  amLODIPine   Tablet 5 milliGRAM(s) Oral daily  aspirin  chewable 81 milliGRAM(s) Oral daily  atorvastatin 20 milliGRAM(s) Oral at bedtime  chlorhexidine 2% Cloths 1 Application(s) Topical <User Schedule>  DULoxetine 20 milliGRAM(s) Oral daily  enoxaparin Injectable 30 milliGRAM(s) SubCutaneous every 24 hours  gabapentin 100 milliGRAM(s) Oral every 8 hours  hydroxychloroquine 200 milliGRAM(s) Oral daily  insulin lispro (ADMELOG) corrective regimen sliding scale   SubCutaneous every 6 hours  melatonin 5 milliGRAM(s) Oral at bedtime    MEDICATIONS  (PRN):  acetaminophen     Tablet .. 650 milliGRAM(s) Oral every 6 hours PRN Mild Pain (1 - 3), Moderate Pain (4 - 6)  benzocaine 15 mG/menthol 3.6 mG Lozenge 1 Lozenge Oral every 6 hours PRN Sore Throat  ondansetron Injectable 4 milliGRAM(s) IV Push every 4 hours PRN Nausea and/or Vomiting    Vital Signs Last 24 Hrs  T(C): 36.6 (02 Feb 2022 05:01), Max: 37.4 (01 Feb 2022 20:26)  T(F): 97.9 (02 Feb 2022 05:01), Max: 99.3 (01 Feb 2022 20:26)  HR: 98 (02 Feb 2022 05:01) (88 - 98)  BP: 105/63 (02 Feb 2022 05:01) (105/63 - 162/68)  BP(mean): --  RR: 18 (02 Feb 2022 05:01) (16 - 18)  SpO2: 95% (02 Feb 2022 05:01) (95% - 95%)    PHYSICAL EXAM:    Constitutional: A&Ox3, NAD    Respiratory: non labored breathing, no respiratory distress    Cardiovascular: NSR, RRR    Gastrointestinal: abdomen soft, nd, appropriately ttp to incisions. Dressings c/d/i.     Genitourinary: De Souza in place draining clear yellow urine     Extremities: wwp, no calf tenderness or edema. SCDs in place       I&O's Detail    01 Feb 2022 07:01  -  02 Feb 2022 07:00  --------------------------------------------------------  IN:    Oral Fluid: 360 mL  Total IN: 360 mL    OUT:    Voided (mL): 1600 mL  Total OUT: 1600 mL    Total NET: -1240 mL          LABS:                RADIOLOGY & ADDITIONAL STUDIES:

## 2022-02-02 NOTE — DISCHARGE NOTE NURSING/CASE MANAGEMENT/SOCIAL WORK - NSDCPEFALRISK_GEN_ALL_CORE
For information on Fall & Injury Prevention, visit: [email or web address] OR  [email or web address] OR  https://www.cdc.gov/steadi/patient.html

## 2022-02-04 LAB — SURGICAL PATHOLOGY STUDY: SIGNIFICANT CHANGE UP

## 2022-02-05 DIAGNOSIS — Z79.899 OTHER LONG TERM (CURRENT) DRUG THERAPY: ICD-10-CM

## 2022-02-05 DIAGNOSIS — M32.9 SYSTEMIC LUPUS ERYTHEMATOSUS, UNSPECIFIED: ICD-10-CM

## 2022-02-05 DIAGNOSIS — G61.0 GUILLAIN-BARRE SYNDROME: ICD-10-CM

## 2022-02-05 DIAGNOSIS — Z86.73 PERSONAL HISTORY OF TRANSIENT ISCHEMIC ATTACK (TIA), AND CEREBRAL INFARCTION WITHOUT RESIDUAL DEFICITS: ICD-10-CM

## 2022-02-05 DIAGNOSIS — Z86.19 PERSONAL HISTORY OF OTHER INFECTIOUS AND PARASITIC DISEASES: ICD-10-CM

## 2022-02-05 DIAGNOSIS — Z79.82 LONG TERM (CURRENT) USE OF ASPIRIN: ICD-10-CM

## 2022-02-05 DIAGNOSIS — J91.0 MALIGNANT PLEURAL EFFUSION: ICD-10-CM

## 2022-02-05 DIAGNOSIS — Z87.891 PERSONAL HISTORY OF NICOTINE DEPENDENCE: ICD-10-CM

## 2022-02-05 DIAGNOSIS — J96.02 ACUTE RESPIRATORY FAILURE WITH HYPERCAPNIA: ICD-10-CM

## 2022-02-05 DIAGNOSIS — C16.9 MALIGNANT NEOPLASM OF STOMACH, UNSPECIFIED: ICD-10-CM

## 2022-02-05 DIAGNOSIS — Z86.16 PERSONAL HISTORY OF COVID-19: ICD-10-CM

## 2022-02-05 DIAGNOSIS — J96.01 ACUTE RESPIRATORY FAILURE WITH HYPOXIA: ICD-10-CM

## 2022-02-05 DIAGNOSIS — E16.2 HYPOGLYCEMIA, UNSPECIFIED: ICD-10-CM

## 2022-02-07 ENCOUNTER — APPOINTMENT (OUTPATIENT)
Dept: SURGICAL ONCOLOGY | Facility: CLINIC | Age: 87
End: 2022-02-07
Payer: MEDICARE

## 2022-02-07 VITALS
DIASTOLIC BLOOD PRESSURE: 79 MMHG | WEIGHT: 110 LBS | HEIGHT: 61 IN | HEART RATE: 103 BPM | BODY MASS INDEX: 20.77 KG/M2 | TEMPERATURE: 98.5 F | OXYGEN SATURATION: 96 % | SYSTOLIC BLOOD PRESSURE: 156 MMHG

## 2022-02-07 PROBLEM — J90 PLEURAL EFFUSION, NOT ELSEWHERE CLASSIFIED: Chronic | Status: ACTIVE | Noted: 2022-01-26

## 2022-02-07 PROBLEM — G61.0 GUILLAIN-BARRE SYNDROME: Chronic | Status: ACTIVE | Noted: 2022-01-26

## 2022-02-07 PROBLEM — K25.9 GASTRIC ULCER, UNSPECIFIED AS ACUTE OR CHRONIC, WITHOUT HEMORRHAGE OR PERFORATION: Chronic | Status: ACTIVE | Noted: 2022-01-26

## 2022-02-07 PROBLEM — D62 ACUTE POSTHEMORRHAGIC ANEMIA: Chronic | Status: ACTIVE | Noted: 2022-01-26

## 2022-02-07 PROBLEM — Z86.79 PERSONAL HISTORY OF OTHER DISEASES OF THE CIRCULATORY SYSTEM: Chronic | Status: ACTIVE | Noted: 2022-01-26

## 2022-02-07 PROBLEM — Z86.69 PERSONAL HISTORY OF OTHER DISEASES OF THE NERVOUS SYSTEM AND SENSE ORGANS: Chronic | Status: ACTIVE | Noted: 2022-01-26

## 2022-02-07 PROBLEM — C80.1 MALIGNANT (PRIMARY) NEOPLASM, UNSPECIFIED: Chronic | Status: ACTIVE | Noted: 2022-01-26

## 2022-02-07 PROBLEM — Z86.19 PERSONAL HISTORY OF OTHER INFECTIOUS AND PARASITIC DISEASES: Chronic | Status: ACTIVE | Noted: 2022-01-26

## 2022-02-07 PROBLEM — C16.9 MALIGNANT NEOPLASM OF STOMACH, UNSPECIFIED: Chronic | Status: ACTIVE | Noted: 2022-01-26

## 2022-02-07 PROBLEM — L93.0 DISCOID LUPUS ERYTHEMATOSUS: Chronic | Status: ACTIVE | Noted: 2022-01-26

## 2022-02-07 PROBLEM — I63.9 CEREBRAL INFARCTION, UNSPECIFIED: Chronic | Status: ACTIVE | Noted: 2022-01-26

## 2022-02-07 PROCEDURE — 99024 POSTOP FOLLOW-UP VISIT: CPT

## 2022-02-11 NOTE — ASSESSMENT
[FreeTextEntry1] : I) normal postop\par \par P) Reviewed path which is T1BN0 stage 1 tumor. I don’t see any role for added therapy in this patient . Will review at tumor board. Will see her again in 3 months. Should advance to regular diet.\par \par Gab Soto MD\par \par Chief Surgical Oncology\par Multidisciplinary GI cancer program\par [facility] [place]\par [facility]\par \par Professor Surgery\par [employer] [facility] [employer] [facility] [employer]\par \par CC Dr Tres Howard  Zoe Alford\par \par

## 2022-02-11 NOTE — PHYSICAL EXAM
[Normal] : well developed, well nourished, in no acute distress [de-identified] : soft, epigastric region is tender to touch appropriate postop

## 2022-02-11 NOTE — HISTORY OF PRESENT ILLNESS
[FreeTextEntry1] : Patient Name: ROXI DÍAZ \par MRN: [id number] \par [place] MRN:[id number]\par Referring Provider: Dr. Tres Howard\par Oncologist: alejandrina\par Date: 2/7/22\par \par Diagnosis: Gastric cancer\par Operative Date: 1/27/22\par Procedure:Laparoscopic distal gastrectomy with lymphadenectomy and Billroth II anastomosis\par \par She presents for a scheduled post operative visit. She is 11 days post op. Surgery uneventful. She is currently in a rehab and is doing well. She does complain of some discomfort and irritation to her upper abdominal incision site. She reports there is some redness and pain especially when sitting up. \par \par Currently, Ms. DÍAZ denies fevers, chills, or night sweats. She is tolerating a regular diet but is eating mostly soft foods and is having regular bowel movements. Pain is controlled.\par \par Final Pathology Showed: pT1bN0 (11 neg nodes)\par

## 2022-02-11 NOTE — REASON FOR VISIT
[Family Member] : family member [de-identified] :  Laparoscopic distal gastrectomy with lymphadenectomy and Billroth II anastomosis [de-identified] : 1/27/22 [de-identified] : 11

## 2022-02-11 NOTE — DATA REVIEWED
[FreeTextEntry1] :  Patient:   ROXI DÍAZ\par \par \par Accession:                             [id number]\par \par Collected Date/Time:                   1/27/2022 10:11 EST\par Received Date/Time:                    1/27/2022 15:47 EST\par \par Surgical Pathology Report - Auth (Verified)\par \par Specimen(s) Submitted\par 1. Hepatic artery lymph node\par 2. D2 lymph node\par 3. Distal gastrectomy long stitch marks proximal margin\par \par Final Diagnosis\par 1. Lymph node, hepatic artery, excision:\par - 1 lymph node negative for carcinoma, 0/1\par \par \par 2. Lymph nodes, D2 dissection:\par - 6 lymph nodes negative for carcinoma, 0/6\par \par 3. 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 The majority of the\par polypoid mass is a villous adenoma with regions of high-grade dysplasia\par ranging to more moderately differentiated adenocarcinoma. This lesion\par does give rise to a poorly differentiated/ signet ring cell carcinoma\par which makes up the bulk of the invasive component. The majority of the\par signet ring component is present within the nodular mucosa surrounding\par the villous adenoma. The patient's prior biopsy was sent for HER2 testing\par and was negative.\par \par An additional lymph node search will be performed. Once this is complete,\par finally AJCC pN stage will be assigned. Additionally, IHC for MMR will\par be performed. All of this information will be issued in an addendum.\par Preliminary findings discussed with Dr. ROSA MARIA Soto on 02/04/2022.\par \par Verified by: Nael Kumar M.D.\par (Electronic Signature)\par Reported on: 02/04/22 16:16 EST, [facility], [address],\par [place] [address]\par Phone: [phone]   Fax: [phone]\par _________________________________________________________________\par \par Comment\par mol 3H\par \par Synoptic Summary\par STOMACH\par SPECIMEN\par Partial gastrectomy, distal\par Procedure:\par TUMOR\par Tumor Site:  Antrum and prepylorus region\par Histologic Type:  Adenocarcinoma\par Radha Classification of Adenocarcinoma:    Predominantly intestinal\par with regions of diffuse\par Alternative Optional Adenocarcinoma Classification (based on WHO):\par Mixed carcinoma (mixture of discrete glandular (tubular) and signet-\par ring / poorly cohesive cellular histological components)\par Histologic Type Comment: P  oorly differentiated adenocarcinoma with\par signet ring cells arising in a villous adenoma with high-grade to\par more moderately differentiated adenocarcinoma\par Histologic Grade:  G3, poorly differentiated, undifferentiated\par Tumor Size: Cannot be determined -   See note above\par Tumor Extent:  Invades submucosa\par Treatment Effect:  No known presurgical therapy\par Lymphovascular Invasion:  Not identified\par Perineural Invasion:  Not identified\par MARGINS\par Margin Status for Invasive Carcinoma:   All margins negative for invasive\par carcinoma\par Margin Status for Dysplasia:  All margins negative for dysplasia\par REGIONAL LYMPH NODES\par Regional Lymph Node Status:  All regional lymph nodes negative for tumor\par Number of Lymph Nodes Examined:   Pending - See note above\par DISTANT METASTASIS\par Distant Site(s) Involved: Cannot be determined\par PATHOLOGIC STAGE CLASSIFICATION  (pTNM, AJCC 8th Edition)\par Reporting of pT, pN, and (when applicable) pM categories is based\par on information available to the pathologist at the time the report\par is issued. As per the AJCC (Chapter 1, 8th Ed.) it is the managing\par physician's responsibility to establish the final pathologic stage based\par upon all pertinent information, including but potentially not limited to\par this pathology report.\par TNM Descriptors: Not applicable\par pT Category:  pT1b\par Regional Lymph Nodes (pN):  Pending - See note above\par pM Category:  Not applicable - pM cannot be determined from the\par submitted specimen(s)\par \par Clinical Information\par Gastric cancer\par \par \par Gross Description\par 1.  Received: In formalin labeled  "hepatic artery lymph node"\par Lymph Nodes:  1\par Size:  1 x 1.0 x 0.4 cm\par Submitted:  Entirely in one cassette: 1A\par \par 2.  Received: In formalin labeled  "D2 lymph node"\par Lymph Nodes:  3\par Size:  Ranging from 0.6 x 0.4 x 0.4 cm to  0.4 x 0.3 x 0.3 cm\par Submitted:  Entirely in 2 cassettes:\par 2A: Three lymph nodes\par 2B: Fat\par \par 3.  Received: Fresh labeled  "distal gastrectomy"\par Dimensions:\par Lesser Curvature:\par Greater Curvature:\par Circumference at Proximal Margin:   13.5 cm\par Gastric Wall Thickness:   0.8 cm the\par Duodenum Length:\par Circumference at Distal Margin:   7 cm\par Duodenum Wall Thickness:\par Mesenteric Thickness:\par Area of Interest - Nodular region with mass:\par Size:  5.0 x 3.5 x 1.5 cm, tan-brown, firm, nodular region with a\par distinct 2.5 cm polypoid mass\par Location:  Prepylorus\par Cut Surface:  Opening the specimen reveals the above-described 3.6 cm\par tan brown nodular region. Within this region is a 2.5 x 1.7 x 1.2 cm\par tan-brown polypoid mass. This region and the mass are serial sectioned\par to reveal a firm tan-white cut surface. The mucosa in the nodular area\par is firm and thickened and appears continuous with the polypoid mass.\par This firm area shows possible extension into the submucosa. It does not\par extend into the underlying muscularis propria.\par Extension:  Confined to mucosa\par Distance from Proximal Margin:   6.5 cm\par Distance from Distal Margin:   2 cm\par Overlying Serosal Surface:   Pink-red, contracted\par Remaining Serosa:  Tan-pink and unremarkable\par Remaining Mucosa:  Tan-pink and unremarkable\par Duodenal Mucosa:  Tan-pink and unremarkable\par \par \par Additional Comments:   Omentum thickness 4 cm, serial section 2 lymph nodes\par found each measuring 0.3 x 0.3 x 0.2 cm, and 0.5 x 0.4 x 0.3 cm , tan-\par white surface\par Inking:\par Serosa:  Black\par Proximal:  Blue\par Distal:  Green\par Submitted:  Representative sections in 22 cassettes:\par 3A-3D: Proximal margin\par 3E-3F: Distal margin\par 3G-3M: Polypoid mass surrounding nodular mucosa\par 3N: Uninvolved mucosa\par 3O-3P: One lymph node, bisected, one each cassette, total 2\par 3Q-3R: More sections, mass, paired section\par 3S-3T: Additional polypoid mass and surrounding mucosa, paired action\par 3U-3V: Additional polypoid mass\par \par Earline Erickson MD 01/28/2022 04:16 PM\par \par LAUREN Devine MD 02/02/2022 11:24 AM\par \par Disclaimer\par In addition to other data that may appear on the specimen containers, all\par labels have been inspected to confirm the presence of the patient's name\par and date of birth.\par \par Histological Processing Performed at [facility], Department of\par Pathology, [address] [place], [place].

## 2022-02-14 ENCOUNTER — APPOINTMENT (OUTPATIENT)
Dept: SURGICAL ONCOLOGY | Facility: CLINIC | Age: 87
End: 2022-02-14

## 2022-02-26 LAB
CULTURE RESULTS: SIGNIFICANT CHANGE UP
SPECIMEN SOURCE: SIGNIFICANT CHANGE UP

## 2022-03-19 LAB
CULTURE RESULTS: SIGNIFICANT CHANGE UP
SPECIMEN SOURCE: SIGNIFICANT CHANGE UP

## 2022-05-16 ENCOUNTER — APPOINTMENT (OUTPATIENT)
Dept: SURGICAL ONCOLOGY | Facility: CLINIC | Age: 87
End: 2022-05-16
Payer: MEDICARE

## 2022-05-16 VITALS
BODY MASS INDEX: 19.45 KG/M2 | TEMPERATURE: 98.5 F | HEART RATE: 83 BPM | DIASTOLIC BLOOD PRESSURE: 72 MMHG | HEIGHT: 61 IN | OXYGEN SATURATION: 95 % | SYSTOLIC BLOOD PRESSURE: 144 MMHG | WEIGHT: 103 LBS

## 2022-05-16 DIAGNOSIS — C16.9 MALIGNANT NEOPLASM OF STOMACH, UNSPECIFIED: ICD-10-CM

## 2022-05-16 PROCEDURE — 99213 OFFICE O/P EST LOW 20 MIN: CPT

## 2022-05-16 RX ORDER — PANCRELIPASE 20000; 68000; 109000 [USP'U]/1; [USP'U]/1; [USP'U]/1
20000-68000 CAPSULE, DELAYED RELEASE ORAL
Refills: 0 | Status: ACTIVE | COMMUNITY

## 2022-05-16 NOTE — HISTORY OF PRESENT ILLNESS
[de-identified] : Patient Name: ROXI DÍAZ \par MRN: [id number] \par [place] MRN: [id number]\par Referring Provider: EFREN SOUZA MD \par Oncologist: alejandrina\par Date: 5/16/22\par \par Diagnosis: gastric cancer\par Operative Date: 1/27/22\par Procedure: Laparoscopic distal gastrectomy\par Pathology: [id number]\par \par She presents for a follow up of gastric cancer. She is 4 months post op. She does endorse a decrease appetite but has started taking Zenpep and has noticed an improvement in appetite.\par She has lost about 10 lbs (10% of body weight) since her surgery.\par \par Currently, Ms. DÍAZ denies abdominal pain but does occasionally gets a pain in the left upper quadrant, she denies nausea or vomiting. She denies changes in bowel habits. She denies fevers, chills, or night sweats.\par \par

## 2022-05-16 NOTE — ASSESSMENT
[FreeTextEntry1] : I) clinically SOLIS \par \par P) We will see her again in Jan 2023. She will see Dr Jeffrey Henrandez from Medical Oncology to help follow her along and will follow up w Dr Bourgeois. All questions answered.\par \par Gab Soto MD\par \par Chief Surgical Oncology\par Multidisciplinary GI cancer program\par [facility] [place]\par [facility]\par \par Professor Surgery\par [facility] [employer]\par \par cc Dr Tres Bourgeois, [address] [facility]\par

## 2022-05-16 NOTE — PHYSICAL EXAM
[Normal Neck Lymph Nodes] : normal neck lymph nodes  [Normal Supraclavicular Lymph Nodes] : normal supraclavicular lymph nodes [Normal] : oriented to person, place and time, with appropriate affect [de-identified] : anicteric [de-identified] : S1,S2, regular rate and rhythm. No murmurs heard. [de-identified] : Clear throughout. No wheezes heard. [de-identified] : incisions well healed [de-identified] : warm, dry

## 2022-06-03 LAB — NEUTROPHILS-BODY FLUID: 2 % — SIGNIFICANT CHANGE UP

## 2023-07-17 ENCOUNTER — APPOINTMENT (OUTPATIENT)
Dept: SURGICAL ONCOLOGY | Facility: CLINIC | Age: 88
End: 2023-07-17
Payer: MEDICARE

## 2023-07-31 ENCOUNTER — APPOINTMENT (OUTPATIENT)
Dept: SURGICAL ONCOLOGY | Facility: CLINIC | Age: 88
End: 2023-07-31
Payer: MEDICARE

## 2023-07-31 VITALS
BODY MASS INDEX: 17.18 KG/M2 | HEART RATE: 92 BPM | DIASTOLIC BLOOD PRESSURE: 83 MMHG | RESPIRATION RATE: 16 BRPM | SYSTOLIC BLOOD PRESSURE: 146 MMHG | WEIGHT: 91 LBS | HEIGHT: 61 IN | TEMPERATURE: 98.4 F | OXYGEN SATURATION: 97 %

## 2023-07-31 DIAGNOSIS — Z90.3 ACQUIRED ABSENCE OF STOMACH [PART OF]: ICD-10-CM

## 2023-07-31 PROCEDURE — 99213 OFFICE O/P EST LOW 20 MIN: CPT

## 2023-07-31 NOTE — RESULTS/DATA
[FreeTextEntry1] : Date: 4/27/23\par Study: PETCT (R)\par Results: IMPRESSION: \par 1.  Hypermetabolic nodule in the left lobe of thyroid. Thyroid ultrasound with biopsy as indicated may be helpful in further evaluation.\par 2.  Otherwise, no abnormal hypermetabolic activity to suggest malignancy at this time.\par 3.  Status post partial gastrectomy with gastrojejunostomy.\par 4.  Small pulmonary nodules, which are likely below the size resolution of PET imaging. Follow-up chest CT in 6 months may be helpful in further evaluation.\par 5.  Nonobstructing right renal calculus.\par 6.  Scattered diverticula without evidence of diverticulitis.

## 2023-07-31 NOTE — PHYSICAL EXAM
[Normal] : oriented to person, place and time, with appropriate affect [de-identified] : abdomen soft, nd, nt. Incisions well healed, c/d/i. No R/G. small area of darkening of skin to suprapubic region, nonttp, no surrounding erythema.

## 2023-07-31 NOTE — HISTORY OF PRESENT ILLNESS
[de-identified] : Patient Name: ROXI DÍAZ  MRN: [id number]  Brent MRN: [id number] Referring Provider: LIBBY SMALLEFREN  Oncologist: [email or web address] Date: 7/31/23  Diagnosis: gastric cancer Operative Date: 1/27/22 Procedure: Laparoscopic distal gastrectomy Pathology: [id number]  She presents for a follow up of gastric cancer. She is 1.5 years post op.  2014/2015 - abdominal pain, bloating, heart burn and has been worked up 2014 - EGD and colonoscopy was unrevealing. she was started on Ranitidine and symptoms improved 2019 - epigastric pain and substernal pain and LLQ pain and was found to be anemic. Seen at [facility] and recommended to have an upper EGD and colonoscopy. 3/2022 - She got COVID and started losing weight unintentionally.  8/2020 - she went for colon/EGD and tested positive for H Pylori.  5/2021 - CT showed ascites and was supposed to have a repeat colonoscopy but was unable to because she had 3 pleural effusions and was seeing a doctor at [facility] for this. 8/2021 - admitted to [facility] for workup of stroke, found to have a TIA but was readmitted a few days later and diagnosed with Guillain Easley Syndrome, treated inpatient and diagnosed with Lupus and Lyme disease. CT scan during hospitalization showed an antral lesion. 12/2021 - EGD showed soft tissue lesion in the prepyloric area, biopsied and positive for adenocarcinoma 12/21/21 - CT CAP showed a gastric antral mass, large left pleural effusion and thyroid nodules, and underwent a thoracentesis 12/22/21 - EUS with Dr. Teran showed a T1A lesion 1/27/22 - s/p laparoscopic distal gastrectomy and lymphadenectomy and Billroth II anastomosis for a [id number] gastric cancer She has been on surveillance protocol with  2/3/23 - PETCT with left supraclavicular LAD, unable to biopsy by IR because no LAD sen 4/7/23 - PETCT negative for malignancy but had a hypermetabolic nodule in the thyroid 4/23/23- surveillance EGD with Dr. Howard reportedly normal  Currently, Ms. DÍAZ reports known nerve pain to L abdomen/back (dx shingles 2 yrs ago), resolves when taking home gabapentin. Overall no acute complaints. Reports eating 3 small meals per day (instead of 6) bc she feels full easily. Denies abdominal pain and discomfort, nausea or vomiting. She denies changes in bowel habits or recent unintentional weight loss. She denies fevers, chills, or night sweats. Of note had femur fracture 2/2 fall s/p surgery in [place] last year.

## 2023-07-31 NOTE — ASSESSMENT
[FreeTextEntry1] : I) clinically SOLIS  P) Will see her again in one year after the next scope All questions answered  Gab Soto MD  Chief Surgical Oncology Multidisciplinary GI cancer program [facility]  Professor Surgery [facility] [place] [facility]

## 2025-01-14 ENCOUNTER — RX ONLY (RX ONLY)
Age: OVER 89
End: 2025-01-14

## 2025-01-14 ENCOUNTER — OFFICE (OUTPATIENT)
Dept: URBAN - METROPOLITAN AREA CLINIC 28 | Facility: CLINIC | Age: OVER 89
Setting detail: OPHTHALMOLOGY
End: 2025-01-14
Payer: MEDICARE

## 2025-01-14 DIAGNOSIS — H01.002: ICD-10-CM

## 2025-01-14 DIAGNOSIS — H01.001: ICD-10-CM

## 2025-01-14 DIAGNOSIS — H01.004: ICD-10-CM

## 2025-01-14 DIAGNOSIS — H40.1132: ICD-10-CM

## 2025-01-14 DIAGNOSIS — H52.7: ICD-10-CM

## 2025-01-14 DIAGNOSIS — B88.0: ICD-10-CM

## 2025-01-14 DIAGNOSIS — H16.223: ICD-10-CM

## 2025-01-14 DIAGNOSIS — H01.005: ICD-10-CM

## 2025-01-14 DIAGNOSIS — H26.493: ICD-10-CM

## 2025-01-14 PROCEDURE — 76514 ECHO EXAM OF EYE THICKNESS: CPT | Performed by: OPHTHALMOLOGY

## 2025-01-14 PROCEDURE — 92014 COMPRE OPH EXAM EST PT 1/>: CPT | Performed by: OPHTHALMOLOGY

## 2025-01-14 ASSESSMENT — CONFRONTATIONAL VISUAL FIELD TEST (CVF)
OS_FINDINGS: FULL
OD_FINDINGS: FULL

## 2025-01-14 ASSESSMENT — REFRACTION_MANIFEST
OD_VA1: 20/30
OD_CYLINDER: -1.25
OS_SPHERE: +0.25
OD_AXIS: 085
OS_VA2: 20/50(J5)
OD_VA1: 20/20-
OD_CYLINDER: -1.00
OS_VA1: 20/30-
OD_VA2: 20/30(J2)
OD_SPHERE: +0.75
OS_CYLINDER: -1.00
OD_SPHERE: +1.25
OS_CYLINDER: -1.00
OD_ADD: +2.50
OS_AXIS: 76
OS_VA1: 20/60
OD_ADD: +2.50
OS_ADD: +2.50
OD_AXIS: 95
OS_SPHERE: +0.75
OS_AXIS: 75
OS_ADD: +2.50

## 2025-01-14 ASSESSMENT — KERATOMETRY
OD_AXISANGLE_DEGREES: 175
OS_K2POWER_DIOPTERS: 44.75
OS_AXISANGLE_DEGREES: 177
OS_K1POWER_DIOPTERS: 43.00
OD_K1POWER_DIOPTERS: 43.75
OD_K2POWER_DIOPTERS: 44.75

## 2025-01-14 ASSESSMENT — PACHYMETRY
OS_CT_UM: 632
OD_CT_CORRECTION: -6
OD_CT_UM: 629
OS_CT_CORRECTION: -6

## 2025-01-14 ASSESSMENT — TONOMETRY: OD_IOP_MMHG: 21

## 2025-01-14 ASSESSMENT — TEAR BREAK UP TIME (TBUT)
OD_TBUT: 1+
OS_TBUT: 1+

## 2025-01-14 ASSESSMENT — VISUAL ACUITY
OS_BCVA: 20/25
OD_BCVA: 20/50

## 2025-01-14 ASSESSMENT — REFRACTION_AUTOREFRACTION
OS_CYLINDER: -1.00
OD_CYLINDER: -1.25
OS_AXIS: 76
OD_SPHERE: +1.25
OD_AXIS: 082
OS_SPHERE: +0.25

## 2025-01-14 ASSESSMENT — LID EXAM ASSESSMENTS
OS_BLEPHARITIS: LLL LUL 4+
OD_BLEPHARITIS: RLL RUL 4+

## 2025-03-31 NOTE — DISCHARGE NOTE PROVIDER - NSDCCPTREATMENT_GEN_ALL_CORE_FT
PRINCIPAL PROCEDURE  Procedure: Laparoscopic distal gastrectomy  Findings and Treatment:       
declines

## 2025-04-22 ENCOUNTER — OFFICE (OUTPATIENT)
Dept: URBAN - METROPOLITAN AREA CLINIC 28 | Facility: CLINIC | Age: OVER 89
Setting detail: OPHTHALMOLOGY
End: 2025-04-22
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
## 2025-04-22 PROCEDURE — NO SHOW FE NO SHOW FEE: Performed by: OPHTHALMOLOGY

## (undated) DEVICE — DRSG DERMABOND 0.7ML

## (undated) DEVICE — CANNULA APPLIED MEDICAL KII OPTICAL 12MM X 100MM Z-THREAD

## (undated) DEVICE — XI TIP COVER

## (undated) DEVICE — ENDOCATCH II 15MM

## (undated) DEVICE — TUBING STRYKER PNEUMOCLEAR HIGH FLOW

## (undated) DEVICE — ENDOCATCH 10MM SPECIMEN POUCH

## (undated) DEVICE — PACK GENERAL LAPAROSCOPY

## (undated) DEVICE — CLIP APPLR DISP 5MM

## (undated) DEVICE — TROCAR APPLIED MEDICAL KII BALLOON BLUNT TIP 12MM X 100MM

## (undated) DEVICE — STAPLER COVIDIEN ENDO GIA STANDARD HANDLE

## (undated) DEVICE — TIP METZENBAUM SCISSOR MONOPOLAR ENDOCUT (ORANGE)

## (undated) DEVICE — SUT PDS II 3-0 27" SH UNDYED

## (undated) DEVICE — SUT PROLENE 1 30" CT

## (undated) DEVICE — TROCAR APPLIED MEDICAL KII FIOS FIRST ENTRY 5MM X 100MM Z-THREAD

## (undated) DEVICE — SUT VICRYL 3-0 18" SH (POP-OFF)

## (undated) DEVICE — SUT VICRYL 3-0 27" CT-1

## (undated) DEVICE — Device

## (undated) DEVICE — ELCTR THUNDERBEAT HANDPIECE 5MM X 35CM FRONT CONTROL

## (undated) DEVICE — SUT VICRYL 4-0 2" RB-1

## (undated) DEVICE — SUT VICRYL 0 27" UR-6

## (undated) DEVICE — TUBING PLUME AWAY 4.0

## (undated) DEVICE — D HELP - CLEARVIEW CLEARIFY SYSTEM

## (undated) DEVICE — PREP CHLORAPREP HI-LITE ORANGE 26ML

## (undated) DEVICE — SPONGE ENDO PEANUT 5MM

## (undated) DEVICE — SUT MONOCRYL 4-0 18" PS-2